# Patient Record
Sex: FEMALE | Race: WHITE | NOT HISPANIC OR LATINO | Employment: OTHER | URBAN - METROPOLITAN AREA
[De-identification: names, ages, dates, MRNs, and addresses within clinical notes are randomized per-mention and may not be internally consistent; named-entity substitution may affect disease eponyms.]

---

## 2018-05-04 ENCOUNTER — OFFICE VISIT CONVERTED (OUTPATIENT)
Dept: PULMONOLOGY | Facility: CLINIC | Age: 63
End: 2018-05-04
Attending: INTERNAL MEDICINE

## 2018-06-21 ENCOUNTER — OFFICE VISIT CONVERTED (OUTPATIENT)
Dept: PULMONOLOGY | Facility: CLINIC | Age: 63
End: 2018-06-21
Attending: PHYSICIAN ASSISTANT

## 2019-02-06 ENCOUNTER — OFFICE VISIT CONVERTED (OUTPATIENT)
Dept: PULMONOLOGY | Facility: CLINIC | Age: 64
End: 2019-02-06
Attending: INTERNAL MEDICINE

## 2019-02-22 ENCOUNTER — HOSPITAL ENCOUNTER (OUTPATIENT)
Dept: OTHER | Facility: HOSPITAL | Age: 64
Discharge: HOME | End: 2019-02-22
Attending: INTERNAL MEDICINE

## 2019-02-22 LAB
BASE EXCESS BLD CALC-SCNC: 0.1 MMOL/L
COHGB MFR BLD: 0.9 % (ref 0–1.5)
CONV ALLEN'S TEST: NORMAL
CONV FHHB: 3.4 % (ref 0–5)
CONV FIO2: NORMAL % (ref 21–100)
CONV SITE: NORMAL
HBA1C MFR BLD: 14.5 % (ref 11.7–14.6)
HCO3 BLDA-SCNC: 24 MMOL/L (ref 22–26)
LABORATORY COMMENT REPORT: NORMAL
LITERS PER MINUTE: NORMAL L/MIN
Lab: NORMAL
METHGB MFR BLD: 0.3 % (ref 0–1.5)
OXYHGB MFR BLD: 95.4 % (ref 94–98)
PCO2 BLD: 36.9 MM[HG] (ref 35–45)
PH UR: 7.43 [PH] (ref 7.35–7.45)
PO2 BLD: 85.7 MM[HG] (ref 80–100)
SAO2 % BLDCOA: 96.6 % (ref 95–99)
SPECIMEN SOURCE: NORMAL

## 2019-05-02 ENCOUNTER — OFFICE VISIT CONVERTED (OUTPATIENT)
Dept: PULMONOLOGY | Facility: CLINIC | Age: 64
End: 2019-05-02
Attending: PHYSICIAN ASSISTANT

## 2020-12-29 ENCOUNTER — HOSPITAL ENCOUNTER (OUTPATIENT)
Dept: GENERAL RADIOLOGY | Facility: HOSPITAL | Age: 65
Discharge: HOME | End: 2020-12-29
Attending: INTERNAL MEDICINE

## 2021-04-07 ENCOUNTER — HOSPITAL ENCOUNTER (OUTPATIENT)
Dept: GENERAL RADIOLOGY | Facility: HOSPITAL | Age: 66
Discharge: HOME | End: 2021-04-07
Attending: NURSE PRACTITIONER

## 2021-05-28 VITALS
HEIGHT: 61 IN | BODY MASS INDEX: 46.29 KG/M2 | HEART RATE: 85 BPM | DIASTOLIC BLOOD PRESSURE: 83 MMHG | WEIGHT: 245.19 LBS | BODY MASS INDEX: 45.34 KG/M2 | OXYGEN SATURATION: 95 % | HEART RATE: 90 BPM | TEMPERATURE: 98.1 F | RESPIRATION RATE: 18 BRPM | SYSTOLIC BLOOD PRESSURE: 118 MMHG | DIASTOLIC BLOOD PRESSURE: 79 MMHG | HEIGHT: 61 IN | OXYGEN SATURATION: 98 % | WEIGHT: 240.12 LBS | RESPIRATION RATE: 14 BRPM | SYSTOLIC BLOOD PRESSURE: 142 MMHG

## 2021-05-28 VITALS
HEIGHT: 61 IN | RESPIRATION RATE: 16 BRPM | DIASTOLIC BLOOD PRESSURE: 62 MMHG | WEIGHT: 245 LBS | BODY MASS INDEX: 45.35 KG/M2 | SYSTOLIC BLOOD PRESSURE: 114 MMHG | BODY MASS INDEX: 46.26 KG/M2 | OXYGEN SATURATION: 93 % | SYSTOLIC BLOOD PRESSURE: 120 MMHG | OXYGEN SATURATION: 98 % | TEMPERATURE: 98.4 F | DIASTOLIC BLOOD PRESSURE: 72 MMHG | HEART RATE: 67 BPM | HEART RATE: 62 BPM | RESPIRATION RATE: 16 BRPM | HEIGHT: 61 IN | TEMPERATURE: 98.2 F | WEIGHT: 240.19 LBS

## 2021-05-28 NOTE — PROGRESS NOTES
Patient: VIRGIL SAWANT     Acct: [id number]     Report: #[id number]  UNIT #: [id number]     : 1955    Encounter Date:2018  PRIMARY CARE: Doreen Brewster  ***Signed***  --------------------------------------------------------------------------------------------------------------------  Chief Complaint      Encounter Date      May 4, 2018            Primary Care Provider      Doreen Brewster            Referring Provider      Doreen Brewster            Patient Complaint      Patient is complaining of      New pt here for Asthma            VITALS      Height 5 ft 1 in / 154.94 cm      Weight 240 lbs 2 oz / 108.944978 kg      BSA 2.23 m2      BMI 45.4 kg/m2      Temperature 98.1 F / 36.72 C - Oral      Pulse 85      Respirations 18      Blood Pressure 118/83 Sitting, Left Arm      Pulse Oximetry 98%, Room air            HPI      The patient is a 63 year old white female with a history of hypertension and     obstructive sleep apnea here today for new patient visit.  The patient has been     having shortness of breath now for greater than one year.  She has seen Dr. Jacobo and diagnosed with asthma.  The shortness of breath she feels has     gotten worse over the course of the past year. She works a      and does have shortness of breath on a daily basis, in particular what makes     her really short of breath is when she walks up a flight of stairs.  She will     develop some wheezing and some chest tightness, but no chest pains.  Episodes     of shortness of breath last for several minutes, better with rest and with the     use of her rescue inhaler, also better with the use of her Symbicort. The     patient does take Symbicort, but takes it approximately six times per week and     most of the time does not take it in the afternoon, just takes it in the     morning.  She denies having any associated chest pain or heart palpitations.      She does have some mild lower extremity edema.  Denies having any orthopnea.      She describes her symptoms as moderate to severe in nature and overall feels     that they are getting worse.  She has no associated cough or mucopurulent     production.  The patient is a lifelong never smoker.  Has no significant     environmental or occupational exposures at this time.            The patient does have obstructive sleep apnea, does not wear her mask on a     daily basis and uses it about every other night because she has difficulties     with comfort.            ROS      Constitutional:  Denies: Fatigue, Fever, Weight gain, Weight loss, Chills,     Insomnia, Other      Respiratory/Breathing:  Complains of: Shortness of air, Wheezing, Cough, Denies    : Hemoptysis, Pleuritic pain, Other      Endocrine:  Denies: Polydipsia, Polyuria, Heat/cold intolerance, Abnorml     menstrual pattern, Diabetes, Other      Eyes:  Denies: Blurred vision, Vision Changes, Other      Ears, nose, mouth, throat:  Denies: Mouth lesions, Thrush, Throat pain,     Hoarseness, Allergies/Hay Fever, Post Nasal Drip, Headaches, Recent Head Injury    , Nose Bleeding, Neck Stiffness, Thyroid Mass, Hearing Loss, Ear Fullness, Dry     Mouth, Nasal or Sinus Pain, Dry Lips, Nasal discharge, Nasal congestion, Other      Cardiovascular:  Denies: Palpitations, Syncope, Claudication, Chest Pain, Wake     up Gasping for air, Leg Swelling, Irregular Heart Rate, Cyanosis, Dyspnea on     Exertion, Other      Gastrointestinal:  Denies: Nausea, Constipation, Diarrhea, Abdominal pain,     Vomiting, Difficulty Swallowing, Reflux/Heartburn, Dysphagia, Jaundice, Bloating    , Melena, Bloody stools, Other      Genitourinary:  Denies: Urinary frequency, Incontinence, Hematuria, Urgency,     Nocturia, Dysuria, Testicular problems, Other      Musculoskeletal:  Denies: Joint Pain, Joint Stiffness, Joint Swelling, Myalgias    , Other      Hematologic/lymphatic:  DENIES: Lymphadenopathy, Bruising, Bleeding tendencies,    "  Other      Neurological:  Denies: Headache, Numbness, Weakness, Seizures, Other      Psychiatric:  Denies: Anxiety, Appropriate Effect, Depression, Other      Sleep:  No: Excessive daytime sleep, Morning Headache?, Snoring, Insomnia?,     Stop breathing at sleep?, Other      Integumentary:  Denies: Rash, Dry skin, Skin Warm to Touch, Other      Immunologic/Allergic:  Denies: Latex allergy, Seasonal allergies, Asthma,     Urticaria, Eczema, Other      Immunization status:  No: Up to date            FAMILY/SOCIAL/MEDICAL HX      Surgical History:  Yes: Abdominal Surgery (lap band), Bladder Surgery (bladder     tacking), Cholecystectomy, Oral Surgery (TOOTH EXTRACTIONS \"LONG TIME AGO\"),     Orthopedic Surgery (right shoulder sx  2014), No: Appendectomy, Bowel Surgery,     CABG, Head Surgery, Vascular Surgery      Stroke - Family Hx:  Grandparent      Diabetes - Family Hx:  Mother      Cancer/Type - Family Hx:  Sister      Other Family Medical History:  Mother      Is Father Still Living?:  No      Is Mother Still Living?:  Yes      Smoking status:  Never smoker      Anticoagulation Therapy:  No      Antibiotic Prophylaxis:  No      Medical History:  Yes: Allergies, Arthritis (generalized), Chronic Bronchitis/    COPD, Hemorrhoids/Rectal Prob (LAP BAND), No: Asthma, Blood Disease,     Chemotherapy/Cancer, Congestive Heart Failu, Deafness or Ringing Ears, Seizures    , Heart Attack, Shortness Of Breath, Miscellaneous Medical/oth      Psychiatric History      none            PREVENTION      Hx Influenza Vaccination:  Yes      Date Influenza Vaccine Given:  Oct 1, 2017      Influenza Vaccine Declined:  No      2 or More Falls Past Year?:  No      Fall Past Year with Injury?:  No      Hx Pneumococcal Vaccination:  No      Encouraged to follow-up with:  PCP regarding preventative exams.      Chart initiated by      Radha Medley MA            ALLERGIES/MEDICATIONS      Allergies:        Coded Allergies:             " MEPERIDINE (Verified  Adverse Reaction, Unknown, NAUSEA, 5/4/18)           PROPOXYPHENE (Verified  Adverse Reaction, Unknown, NAUSEA, 5/4/18)      Medications    Last Reconciled on 5/4/18 08:45 by ADRIAN MORENO MD      (nutrilite liver)   No Conflict Check               Reported         5/4/18       Gluc Rodríguez/Chondro Rodríguez A/Vit C/Mn (Osteo Biflex) 1 Each Tablet      1 TAB PO QDAY, #30 TAB         Reported         5/4/18       traZODone HCl (Desyrel) 50 Mg Tablet      50 MG PO HS, #30 TAB 0 Refills         Reported         5/4/18       Loratadine (Loratadine) 10 Mg Tablet      10 MG PO HS, #30 TAB 0 Refills         Reported         5/4/18       Budesonide/Formoterol Fumarate (Symbicort 160/4.5 Mcg) 10.2 Gm Inh      2 PUFF INH RTBID, #1 INH 0 Refills         Reported         5/4/18       Ibuprofen (Ibuprofen) 600 Mg Tablet      PO HS, #120 TAB 0 Refills         Reported         5/4/18       Ibuprofen (Ibuprofen) 800 Mg Tablet      800 MG PO QDAY, #90 TAB 0 Refills         Reported         5/4/18       Magnesium Amino Acid Chelate (Magnesium*) 100 Mg Tablet      500 MG PO QDAY, TAB         Reported         5/4/18       Aspirin (Aspirin Baby *) 81 Mg Tab.chew      81 MG PO QDAY, #30 TAB.CHEW 0 Refills         Reported         5/4/18       Cholecalciferol (Vitamin D3*) 1,000 Unit Tab      2000 UNITS PO QDAY, #60 TAB 0 Refills         Reported         5/4/18       Metoprolol Succinate (Metoprolol Succinate*) 50 Mg Tab.er.24h      12.5 MG PO BID, #30 TAB.SR.24H 0 Refills         Reported         5/4/18       Thyroid ([place] Thyroid*) 30 Mg Tab      30 MG PO BID, #30 TAB         Reported         5/4/18       Milk Thistle Fruit Extract (Milk Thistle) 175 Mg Capsule      1000 MG PO BID, CAP         Reported         9/11/14      Current Medications      Current Medications Reviewed 5/4/18            EXAM      GEN-patient appears stated age resting comfortable in no acute distress      Eyes-PERRL,  conjunctiva are normal in  appearance extraocular muscles are intact    , no scleral icterus      Nasal-both nares are patent turbinates appear normal no polyps seen no nasal     discharge or ulcerations      Ears-tympanic membranes are normal no erythema no bulging, normal to inspection      Lymphatic-no swollen or enlarged cervical nodes, or axillary node, or femoral     nodes, or supraclavicular nodes      Mouth normal dentition, no erythema no ulcerations oropharynx appears normal no     exudate no evidence of postnasal drip, MP(3)      Neck-there are no palpable supraclavicular or cervical adenopathy, thyroid is     normal in appearance no apparent nodules, there is no inspiratory or expiratory     stridor      Respiratory-patient exhibits normal work of breathing, speaking in full     sentences without difficulty, the chest is normal in appearance, clear to     auscultation with no wheezes rales or rhonchi, chest is normal to percussion on     both the right and left sides      Cardiovascular-the heart rate is normal and regular S1 and S2 present with no     murmur or extra heart sounds, there is no JVD or pedal edema present      GI-the abdomen is normal in appearance, bowel sounds present and normal in all     quadrants no hepatosplenomegaly or masses felt      Extremities-no clubbing is present, pulses present in all extremities,     capillary refill time is normal      Musculoskeletal-Normal strength in upper and lower extremities, inspection     shows no evidence of muscle atrophy      Skin-skin is normal in appearance it is warm and dry, no rashes present, no     evidence of cyanosis, palpation reveals no masses      Neurological-the patient is alert and oriented to time place and person, moves     all 4 extremities, normal gait, normal affect and mood, CN2-12 intact      Psych-normal judgment and insight is good, normal mood and affect, alert and     oriented to person, place, and time, and date      Vtials      Vitals:              Height 5 ft 1 in / 154.94 cm           Weight 240 lbs 2 oz / 108.107331 kg           BSA 2.23 m2           BMI 45.4 kg/m2           Temperature 98.1 F / 36.72 C - Oral           Pulse 85           Respirations 18           Blood Pressure 118/83 Sitting, Left Arm           Pulse Oximetry 98%, Room air            REVIEW      Results Reviewed      [facility] Results Reviewed?:  Yes Prev Lab Results, Yes Prev Radiology Results, Yes     Previous [facility] Records            Assessment      SOB (shortness of breath) - R06.02            Notes      New Medications      * THYROID ([place] Thyroid*) 30 MG TAB: 30 MG PO BID #30      * Metoprolol Succinate (Metoprolol Succinate*) 50 MG TAB.ER.24H: 12.5 MG PO BID     #30      * Cholecalciferol (Vitamin D3*) 1,000 UNIT TAB: 2,000 UNITS PO QDAY #60      * Aspirin (Aspirin Baby *) 81 MG TAB.CHEW: 81 MG PO QDAY #30      * Magnesium Amino Acid Chelate (Magnesium*) 100 MG TABLET: 500 MG PO QDAY      * Ibuprofen 800 MG TABLET: 800 MG PO QDAY #90      * Ibuprofen 600 MG TABLET: PO HS #120      * Budesonide/Formoterol Fumarate (Symbicort 160/4.5 Mcg) 10.2 GM INH: 2 PUFF     INH RTBID #1      * Loratadine 10 MG TABLET: 10 MG PO HS #30      * traZODone HCl (Desyrel) 50 MG TABLET: 50 MG PO HS #30      * Gluc Rodríguez/Chondro Rodríguez A/Vit C/Mn (Osteo Biflex) 1 EACH TABLET: 1 TAB PO QDAY #30      * (nutrilite liver):       * Fluticasone/Umeclidin/Vilanter (Trelegy Ellipta 100-62.5-25) 1 EACH BLST.W.DEV    : 1 PUFF INH RTQDAY #1      Changed Medications      * Milk Thistle Fruit Extract (Milk Thistle) 175 MG CAPSULE: 1,000 MG PO BID       Instructions: 1 CAP      Discontinued Medications      * Aspirin (Aspirin*) 325 MG TAB: 325 MG PO QDAY #14      * oxyCODONE/Acetaminophen 7.5/325 Mg 1 TAB TABLET: 1 TAB PO Q4H PRN PAIN #50      New Diagnostics      * Echo Complete, Week       Dx: SOB (shortness of breath) - R06.02      ASSESSMENT/PLAN:       1.  Moderate persistent asthma.  At this time the patient has  difficulties     taking her medication as prescribed. We will add an anticholinergic agent and     we will change patient's medication to Trelegy to help with compliance.      2.  Allergic rhinitis. Continue Claritin.      3.  Obstructive sleep apnea. Continue pap therapy and we will change her     interface.      4.  See patient back in 8 weeks to assess response to medication.      5. Dyspnea.  We will obtain echocardiogram to rule out cardiac causes.      6.  I have personally reviewed laboratory data, imaging as well as previous     medical records.            Patient Education      Education resources provided:  Yes      Patient Education Provided:  Sleep Apnea                 Disclaimer: Converted document may not contain table formatting or lab diagrams. Please see [facility] for the authenticated document.

## 2021-05-28 NOTE — PROGRESS NOTES
Patient: VIRGIL SAWANT     Acct: [id number]     Report: #[id number]  UNIT #: [id number]     : 1955    Encounter Date:2019  PRIMARY CARE: Doreen Brewster  ***Signed***  --------------------------------------------------------------------------------------------------------------------  Chief Complaint      Encounter Date      May 2, 2019            Primary Care Provider      Doreen Brewster            Referring Provider      Doreen Brewster            Patient Complaint      Patient is complaining of      3 month follow up/soa            VITALS      Height 5 ft 1.00 in / 154.94 cm      Weight 245 lbs  / 111.609010 kg      BSA 2.06 m2      BMI 46.3 kg/m2      Temperature 98.4 F / 36.89 C - Oral      Pulse 67      Respirations 16      Blood Pressure 114/62 Sitting, Left Arm      Pulse Oximetry 93%, room air            HPI      The patient is a very pleasant 64 year old white female patient of Dr. Gonzalez's    here for follow up today for her fatigue. She has a history of morbid obesity,     obstructive sleep apnea on nightly CPAP. Her CPAP compliance has been very good     and her apnea hypopnea index is very low. It was 0.8 on most recent CPAP     compliance data. Despite this, she does not feel well rested, does not feel like    she is getting good sleep and always feels fatigued. At her last visit in     2019 Dr. Gonzalez requested labs from her primary care provider to make     sure she was no anemic or having iron deficiency and unfortunately we did not     receive them. He also ordered an arterial blood gas to rule out hypercapneia and    her arterial blood gas was grossly normal. She continues to be very fatigued so     she is asking me about possible restless leg syndrome. She denies any increased     dyspnea, coughing or wheezing, denies any hemoptysis, fever or chills. She     continues to use Symbicort for her asthma and feels it helps her.             I reviewed her Review of Systems,  medical, surgical and family history and agree    with those as entered.      Copies To:   Carter Gonzalez      Constitutional:  Complains of: Fatigue; Denies: Fever, Weight gain, Weight loss,    Chills, Insomnia, Other      Respiratory/Breathing:  Complains of: Shortness of air, Wheezing; Denies: Cough,    Hemoptysis, Pleuritic pain, Other      Endocrine:  Denies: Polydipsia, Polyuria, Heat/cold intolerance, Abnorml     menstrual pattern, Diabetes, Other      Eyes:  Denies: Blurred vision, Vision Changes, Other      Ears, nose, mouth, throat:  Denies: Mouth lesions, Thrush, Throat pain,     Hoarseness, Allergies/Hay Fever, Post Nasal Drip, Headaches, Recent Head Injury,    Nose Bleeding, Neck Stiffness, Thyroid Mass, Hearing Loss, Ear Fullness, Dry     Mouth, Nasal or Sinus Pain, Dry Lips, Nasal discharge, Nasal congestion, Other      Cardiovascular:  Denies: Palpitations, Syncope, Claudication, Chest Pain, Wake     up Gasping for air, Leg Swelling, Irregular Heart Rate, Cyanosis, Dyspnea on     Exertion, Other      Gastrointestinal:  Denies: Nausea, Constipation, Diarrhea, Abdominal pain,     Vomiting, Difficulty Swallowing, Reflux/Heartburn, Dysphagia, Jaundice,     Bloating, Melena, Bloody stools, Other      Genitourinary:  Denies: Urinary frequency, Incontinence, Hematuria, Urgency,     Nocturia, Dysuria, Testicular problems, Other      Musculoskeletal:  Denies: Joint Pain, Joint Stiffness, Joint Swelling, Myalgias,    Other      Hematologic/lymphatic:  DENIES: Lymphadenopathy, Bruising, Bleeding tendencies,     Other      Neurological:  Denies: Headache, Numbness, Weakness, Seizures, Other      Psychiatric:  Denies: Anxiety, Appropriate Effect, Depression, Other      Sleep:  No: Excessive daytime sleep, Morning Headache?, Snoring, Insomnia?, Stop    breathing at sleep?, Other      Integumentary:  Denies: Rash, Dry skin, Skin Warm to Touch, Other      Immunologic/Allergic:  Denies: Latex  "allergy, Seasonal allergies, Asthma,     Urticaria, Eczema, Other      Immunization status:  No: Up to date            FAMILY/SOCIAL/MEDICAL HX      Surgical History:  Yes: Abdominal Surgery (lap band), Bladder Surgery (bladder     tacking), Cholecystectomy, Oral Surgery (TOOTH EXTRACTIONS \"LONG TIME AGO\"),     Orthopedic Surgery (right shoulder sx  2014); No: Appendectomy, Bowel Surgery,     CABG, Head Surgery, Vascular Surgery      Stroke - Family Hx:  Grandparent      Diabetes - Family Hx:  Mother      Cancer/Type - Family Hx:  Sister      Other Family Medical History:  Mother      Is Father Still Living?:  No      Is Mother Still Living?:  Yes      Social History:  No Tobacco Use, No Alcohol Use, No Recreational Drug use      Smoking status:  Never smoker      Anticoagulation Therapy:  No      Antibiotic Prophylaxis:  No      Medical History:  Yes: Allergies, Arthritis (generalized), Chronic     Bronchitis/COPD, Hemorrhoids/Rectal Prob (LAP BAND); No: Alcoholism, Anemia,     Asthma, Atrial Fibrillation, Blood Disease, Broken Bones, Cataracts, Chemical     Dependency, Chemotherapy/Cancer, Emphysema, Chronic Liver Disease, Colon     Trouble, Colitis, Diverticulitis, Congestive Heart Failu, Deafness or Ringing     Ears, Convulsions, Depression, Anxiety, Bipolar Disorder, PTSD, Diabetes,     Epilepsy, Seizures, Forgetfullness, Glaucoma, Gall Stones, Gout, Head Injury,     Heart Attack, Heart Murmur, GERD, Hepatitis, Hiatal Hernia, High Blood Pressure,    High Cholesterol, HIV (Do not ask - volu, Jaundice, Kidney or Bladder Disease,     Kidney Stones, Migrane Headaches, Mitral Valve Prolapse, Night sweats,     Phlebitis, Psychiatric Care, Reflux Disease, Rheumatic Fever, Sexually     Transmitted Dis, Shortness Of Breath, Sinus Trouble, Skin Disease/Psoriais/Ecz,     Stroke, Thyroid Problem, Tuberculosis or Pos TB Te, Miscellaneous Medical/oth      Psychiatric History      none            PREVENTION      Hx Influenza " Vaccination:  Yes      Date Influenza Vaccine Given:  Oct 1, 2018      Influenza Vaccine Declined:  No      2 or More Falls Past Year?:  No      Fall Past Year with Injury?:  No      Hx Pneumococcal Vaccination:  Yes      Encouraged to follow-up with:  PCP regarding preventative exams.      Chart initiated by      sunitha cat ma            ALLERGIES/MEDICATIONS      Allergies:        Coded Allergies:             MEPERIDINE (Verified  Adverse Reaction, Unknown, NAUSEA, 5/2/19)           PROPOXYPHENE (Verified  Adverse Reaction, Unknown, NAUSEA, 5/2/19)      Medications    Last Reconciled on 5/2/19 16:47 by LORRAINE BANGURA      rOPINIRole HCl (Requip) 0.25 Mg Tab      0.25 MG PO HS for 30 Days, #30 TAB 3 Refills         Prov: Mulu Cruz PA-C         5/2/19       Albuterol (Proair HFA) 8.5 Gm Inh      2 PUFFS INH RTQ4H, #1 INH 4 Refills         Prov: Carter Gonzalez         2/6/19       Budesonide/Formoterol Fumarate (Symbicort 160/4.5 Mcg) 10.2 Gm Inh      2 PUFF INH BID, #3 INH 4 Refills         Prov: Carter Gonzalez         2/6/19       (osteoprime plus)   No Conflict Check               Reported         2/6/19       Coenzyme Q10 (Co Q-10) 100 Mg Capsule      100 MG PO QDAY for 30 Days, #30 CAP         Reported         2/6/19       (Msm)   No Conflict Check               Reported         2/6/19       (biote probiotic)   No Conflict Check               Reported         2/6/19       Thyroid,Pork (NP Thyroid*) 30 Mg Tab      32.5 MG PO QDAY, TAB         Reported         2/6/19       traZODone HCl (traZODone HCl*) 100 Mg Tablet      100 MG PO HS, #30 TAB 0 Refills         Reported         2/6/19       Potassium Gluconate (Potassium Gluconate) 99 Mg Tablet      550 MG PO QDAY for 30 Days, #165 TAB         Reported         6/21/18       (nutrilite liver)   No Conflict Check               Reported         5/4/18       Loratadine (Loratadine) 10 Mg Tablet      10 MG PO HS, #30 TAB 0 Refills         Reported          5/4/18       Ibuprofen (Ibuprofen) 600 Mg Tablet      PO HS, #120 TAB 0 Refills         Reported         5/4/18       Ibuprofen (Ibuprofen) 800 Mg Tablet      800 MG PO QDAY, #90 TAB 0 Refills         Reported         5/4/18       Magnesium Amino Acid Chelate (Magnesium*) 100 Mg Tablet      500 MG PO QDAY, TAB         Reported         5/4/18       Aspirin Chew (Aspirin Baby) 81 Mg Tab.chew      81 MG PO QDAY, #30 TAB.CHEW 0 Refills         Reported         5/4/18       Cholecalciferol (Vitamin D3*) 1,000 Unit Tab      2000 UNITS PO QDAY, #60 TAB 0 Refills         Reported         5/4/18       Metoprolol Succinate (Metoprolol Succinate*) 50 Mg Tab.er.24h      12.5 MG PO BID, #30 TAB.SR.24H 0 Refills         Reported         5/4/18      Current Medications      Current Medications Reviewed 5/2/19            EXAM      GEN-patient appears stated age resting comfortable in no acute distress      Eyes-PERRL,  conjunctiva are normal in appearance extraocular muscles are     intact, no scleral icterus      Lymphatic-no swollen or enlarged cervical nodes, or axillary node, or femoral     nodes, or supraclavicular nodes      Mouth very poor dentition, no erythema no ulcerations oropharynx appears normal     no exudate no evidence of postnasal drip, MP       Neck-there are no palpable supraclavicular or cervical adenopathy, thyroid is     normal in appearance no apparent nodules, there is no inspiratory or expiratory     stridor      Respiratory-grossly clear to auscultation, no wheezes, rhonchi or crackles     appreciated, normal work of breathing noted noted.        Cardiovascular-the heart rate is normal and regular S1 and S2 present with no     murmur or extra heart sounds, there is no JVD or pedal edema present      GI-the abdomen is normal in appearance, bowel sounds present and normal in all     quadrants no hepatosplenomegaly or masses felt      Extremities-no clubbing is present, pulses present in all  extremities, capillary    refill time is normal      Musculoskeletal-Normal strength in upper and lower extremities, inspection shows    no evidence of muscle atrophy      Skin-skin is normal in appearance it is warm and dry, no rashes present, no     evidence of cyanosis, palpation reveals no masses      Neurological-the patient is alert and oriented to time place and person, moves     all 4 extremities, normal gait, normal affect and mood, CN2-12 intact      Psych-normal judgment and insight is good, normal mood and affect, alert and     oriented to person, place, and time, and date      Vtials      Vitals:             Height 5 ft 1.00 in / 154.94 cm           Weight 245 lbs  / 111.470390 kg           BSA 2.06 m2           BMI 46.3 kg/m2           Temperature 98.4 F / 36.89 C - Oral           Pulse 67           Respirations 16           Blood Pressure 114/62 Sitting, Left Arm           Pulse Oximetry 93%, room air            REVIEW      Results Reviewed      [facility] Results Reviewed?:  Yes Prev Lab Results, Yes Prev Radiology Results, Yes     Previous [facility] Records            Assessment      Notes      New Medications      * rOPINIRole HCl (Requip) 0.25 MG TAB: 0.25 MG PO HS 30 Days #30      Changed Medications      * Metoprolol Succinate (Metoprolol Succinate*) 50 MG TAB.ER.24H: 12.5 MG PO BID       #30         Instructions: 1/2 a tab am 1/2 a tab pm      ASSESSMENT:       1.  Excessive daytime sleepiness/obstructive sleep apnea appears to be well     controlled on nightly CPAP       2.  Morbid obesity with body mass index of 46.3.      3. Asthma without acute exacerbation.             PLAN:      1. I have discussed with the patient that her recent arterial blood gas was     within normal limits. I have also discussed the patient with Dr. Gonzalez about     possibly repeating a  sleep study since it does not appear that her previous      sleep study done through Dr. Jacobo's office made any comment to if she was      having any abnormal leg movements or any indication of restless leg syndrome. I     have offered doing a repeat  sleep study to the patient or treating her with a     low dose of Requip for possible restless leg syndrome. If restless leg syndrome     is the cause of these symptoms that should improve with the Requip. She would     like to try this so I have prescribed 2.5 mg q HS.       2. Continue Symbicort and albuterol as needed for her asthma.       3. I will request recent blood work from her primary care provider's office. The    patient believes her blood work was done in January 2019. We will review this to    make sure she is not anemic or having iron deficiency.       4. Follow up with Dr. Gonzalez in 3 months, sooner if needed.            Patient Education      Patient Education Provided:  Sleep Apnea      Time Spent:  > 50% /[facility]                 Disclaimer: Converted document may not contain table formatting or lab diagrams. Please see [employer] for the authenticated document.

## 2021-05-28 NOTE — PROGRESS NOTES
Patient: VIRGIL SAWANT     Acct: [id number]     Report: #[id number]  UNIT #: [id number]     : 1955    Encounter Date:2019  PRIMARY CARE: Doreen Brewster  ***Signed***  --------------------------------------------------------------------------------------------------------------------  Chief Complaint      Encounter Date      2019            Primary Care Provider      Doreen Brewster            Referring Provider      Doreen Brewster            Patient Complaint      Patient is complaining of      Pt here for 8m f/u, billy            VITALS      Height 5 ft 1 in / 154.94 cm      Weight 245 lbs 3 oz / 111.368862 kg      BSA 2.06 m2      BMI 46.3 kg/m2      Pulse 90      Respirations 14      Blood Pressure 142/79 Sitting, Right Arm      Pulse Oximetry 95%, Room air            HPI      The patient is a 64 year old female here today for follow up.  The patient has     sleep apnea, has apnea/hypopnea index of 0.8, still feels fatigue, uses her     fitibt tracker which says she is not getting adequate sleep at night.  The     patient sleeps for greater than six hours and most of the time eight hours on a     nightly basis.  She has fatigue, has to use 5 hour energy during the day, has     had weight gain of more than 25 pounds since her initial office to me. She has     no shortness of breath at this time. She reports compliance with the use of her     Symbicort, uses her rescue inhaler very infrequently.  She denies having any     chest pains or lower extremity edema.  She has not been started on any new     medications and has not been started on any new hypnotic medications.  She uses     trazodone to sleep at night and feels it helps with her sleep as far as going to    sleep, but she still feels excessively tired during the day.            ROS      Constitutional:  Denies: Fatigue, Fever, Weight gain, Weight loss, Chills,     Insomnia, Other      Respiratory/Breathing:  Complains of: Shortness of air;  "Denies: Wheezing, Cough,    Hemoptysis, Pleuritic pain, Other      Endocrine:  Denies: Polydipsia, Polyuria, Heat/cold intolerance, Abnorml     menstrual pattern, Diabetes, Other      Eyes:  Denies: Blurred vision, Vision Changes, Other      Ears, nose, mouth, throat:  Denies: Mouth lesions, Thrush, Throat pain, Dania    rseness, Allergies/Hay Fever, Post Nasal Drip, Headaches, Recent Head Injury,     Nose Bleeding, Neck Stiffness, Thyroid Mass, Hearing Loss, Ear Fullness, Dry     Mouth, Nasal or Sinus Pain, Dry Lips, Nasal discharge, Nasal congestion, Other      Cardiovascular:  Denies: Palpitations, Syncope, Claudication, Chest Pain, Wake     up Gasping for air, Leg Swelling, Irregular Heart Rate, Cyanosis, Dyspnea on     Exertion, Other      Gastrointestinal:  Denies: Nausea, Constipation, Diarrhea, Abdominal pain,     Vomiting, Difficulty Swallowing, Reflux/Heartburn, Dysphagia, Jaundice,     Bloating, Melena, Bloody stools, Other      Genitourinary:  Denies: Urinary frequency, Incontinence, Hematuria, Urgency,     Nocturia, Dysuria, Testicular problems, Other      Musculoskeletal:  Denies: Joint Pain, Joint Stiffness, Joint Swelling, Myalgias,    Other      Hematologic/lymphatic:  DENIES: Lymphadenopathy, Bruising, Bleeding tendencies,     Other      Neurological:  Denies: Headache, Numbness, Weakness, Seizures, Other      Psychiatric:  Denies: Anxiety, Appropriate Effect, Depression, Other      Sleep:  No: Excessive daytime sleep, Morning Headache?, Snoring, Insomnia?, Stop    breathing at sleep?, Other      Integumentary:  Denies: Rash, Dry skin, Skin Warm to Touch, Other      Immunologic/Allergic:  Denies: Latex allergy, Seasonal allergies, Asthma,     Urticaria, Eczema, Other      Immunization status:  No: Up to date            FAMILY/SOCIAL/MEDICAL HX      Surgical History:  Yes: Abdominal Surgery (lap band), Bladder Surgery (bladder     tacking), Cholecystectomy, Oral Surgery (TOOTH EXTRACTIONS \"LONG TIME " "AGO\"),     Orthopedic Surgery (right shoulder sx  2014); No: Appendectomy, Bowel Surgery,     CABG, Head Surgery, Vascular Surgery      Stroke - Family Hx:  Grandparent      Diabetes - Family Hx:  Mother      Cancer/Type - Family Hx:  Sister      Other Family Medical History:  Mother      Is Father Still Living?:  No      Is Mother Still Living?:  Yes      Smoking status:  Never smoker      Anticoagulation Therapy:  No      Antibiotic Prophylaxis:  No      Medical History:  Yes: Allergies, Arthritis (generalized), Chronic     Bronchitis/COPD, Hemorrhoids/Rectal Prob (LAP BAND); No: Asthma, Blood Disease,     Chemotherapy/Cancer, Congestive Heart Failu, Deafness or Ringing Ears, Seizures,    Heart Attack, Shortness Of Breath, Sinus Trouble, Miscellaneous Medical/oth      Psychiatric History      None            PREVENTION      Hx Influenza Vaccination:  Yes      Date Influenza Vaccine Given:  Oct 1, 2018      Influenza Vaccine Declined:  No      2 or More Falls Past Year?:  No      Fall Past Year with Injury?:  No      Hx Pneumococcal Vaccination:  No      Encouraged to follow-up with:  PCP regarding preventative exams.      Chart initiated by      Radha Medley MA            ALLERGIES/MEDICATIONS      Allergies:        Coded Allergies:             MEPERIDINE (Verified  Adverse Reaction, Unknown, NAUSEA, 2/6/19)           PROPOXYPHENE (Verified  Adverse Reaction, Unknown, NAUSEA, 2/6/19)      Medications    Last Reconciled on 2/6/19 16:57 by CARTER MORENO MD      Albuterol (Proair HFA) 8.5 Gm Inh      2 PUFFS INH RTQ4H, #1 INH 4 Refills         Prov: Carter Moreno         2/6/19       Budesonide/Formoterol Fumarate (Symbicort 160/4.5 Mcg) 10.2 Gm Inh      2 PUFF INH BID, #3 INH 4 Refills         Prov: Carter Moreno         2/6/19       Budesonide/Formoterol Fumarate (Symbicort 80/4.5 Mcg) 10.2 Gm Hfa.aer.ad      2 PUFF INH RTBID, #1 INH         Reported         2/6/19       (osteoprime plus)   No Conflict Check "               Reported         2/6/19       Coenzyme Q10 (Co Q-10) 100 Mg Capsule      100 MG PO QDAY for 30 Days, #30 CAP         Reported         2/6/19       (Msm)   No Conflict Check               Reported         2/6/19       (biote probiotic)   No Conflict Check               Reported         2/6/19       Thyroid,Pork (NP Thyroid*) 30 Mg Tab      32.5 MG PO QDAY, TAB         Reported         2/6/19       traZODone HCl (traZODone HCl*) 100 Mg Tablet      100 MG PO HS, #30 TAB 0 Refills         Reported         2/6/19       Potassium Gluconate (Potassium Gluconate) 99 Mg Tablet      550 MG PO QDAY for 30 Days, #165 TAB         Reported         6/21/18       (nutrilite liver)   No Conflict Check               Reported         5/4/18       Loratadine (Loratadine) 10 Mg Tablet      10 MG PO HS, #30 TAB 0 Refills         Reported         5/4/18       Ibuprofen (Ibuprofen) 600 Mg Tablet      PO HS, #120 TAB 0 Refills         Reported         5/4/18       Ibuprofen (Ibuprofen) 800 Mg Tablet      800 MG PO QDAY, #90 TAB 0 Refills         Reported         5/4/18       Magnesium Amino Acid Chelate (Magnesium*) 100 Mg Tablet      500 MG PO QDAY, TAB         Reported         5/4/18       Aspirin Chew (Aspirin Baby) 81 Mg Tab.chew      81 MG PO QDAY, #30 TAB.CHEW 0 Refills         Reported         5/4/18       Cholecalciferol (Vitamin D3*) 1,000 Unit Tab      2000 UNITS PO QDAY, #60 TAB 0 Refills         Reported         5/4/18       Metoprolol Succinate (Metoprolol Succinate*) 50 Mg Tab.er.24h      12.5 MG PO BID, #30 TAB.SR.24H 0 Refills         Reported         5/4/18      Current Medications      Current Medications Reviewed 2/6/19            EXAM      GEN-patient appears stated age resting comfortable in no acute distress      Eyes-PERRL,  conjunctiva are normal in appearance extraocular muscles are     intact, no scleral icterus      Nasal-both nares are patent turbinates appear normal no polyps seen no nasal      discharge or ulcerations      Ears-tympanic membranes are normal no erythema no bulging, normal to inspection      Lymphatic-no swollen or enlarged cervical nodes, or axillary node, or femoral     nodes, or supraclavicular nodes      Mouth normal dentition, no erythema no ulcerations oropharynx appears normal no     exudate no evidence of postnasal drip, MP(3)      Neck-there are no palpable supraclavicular or cervical adenopathy, thyroid is     normal in appearance no apparent nodules, there is no inspiratory or expiratory     stridor      Respiratory-patient exhibits normal work of breathing, speaking in full     sentences without difficulty, the chest is normal in appearance, clear to     auscultation with no wheezes rales or rhonchi, chest is normal to percussion on     both the right and left sides      Cardiovascular-the heart rate is normal and regular S1 and S2 present with no     murmur or extra heart sounds, there is no JVD or pedal edema present      GI-the abdomen is normal in appearance, bowel sounds present and normal in all     quadrants no hepatosplenomegaly or masses felt      Extremities-no clubbing is present, pulses present in all extremities, capillary     refill time is normal      Musculoskeletal-Normal strength in upper and lower extremities, inspection shows     no evidence of muscle atrophy      Skin-skin is normal in appearance it is warm and dry, no rashes present, no     evidence of cyanosis, palpation reveals no masses      Neurological-the patient is alert and oriented to time place and person, moves     all 4 extremities, normal gait, normal affect and mood, CN2-12 intact      Psych-normal judgment and insight is good, normal mood and affect, alert and     oriented to person, place, and time, and date      Vtials      Vitals:             Height 5 ft 1 in / 154.94 cm           Weight 245 lbs 3 oz / 111.764959 kg           BSA 2.06 m2           BMI 46.3 kg/m2           Pulse 90            Respirations 14           Blood Pressure 142/79 Sitting, Right Arm           Pulse Oximetry 95%, Room air            REVIEW      Results Reviewed      [facility] Results Reviewed?:  Yes Prev Lab Results, Yes Prev Radiology Results, Yes     Previous [facility] Records            Assessment      Fatigue - R53.83            Notes      New Medications      * traZODone HCl (traZODone HCl*) 100 MG TABLET: 100 MG PO HS #30         Replaced traZODone HCl (Desyrel) 50 MG TABLET:         50 MG PO HS #30      * Thyroid,Pork (NP Thyroid*) 30 MG TAB: 32.5 MG PO QDAY      * (biote probiotic):       * (MSM):       * COENZYME Q10 (Co Q-10) 100 MG CAPSULE: 100 MG PO QDAY 30 Days #30      * (osteoprime plus):       * Budesonide/Formoterol Fumarate (Symbicort 80/4.5 Mcg) 10.2 GM HFA.AER.AD: 2       PUFF INH RTBID #1      * Budesonide/Formoterol Fumarate (Symbicort 160/4.5 Mcg) 10.2 GM INH: 2 PUFF INH      BID #3      * ALBUTEROL (Proair HFA) 8.5 GM INH: 2 PUFFS INH RTQ4H #1      Discontinued Medications      * Fluticasone/Umeclidin/Vilanter (Trelegy Ellipta 100-62.5-25) 1 EACH       BLST.W.DEV: 1 PUFF INH RTQDAY #1      New Diagnostics      * ABGS, Week         Dx: Fatigue - R53.83      ASSESSMENT:       1.  Excessive daytime sleepiness.      2.  Sleep apnea.        3.  Morbid obesity with body mass index of 46.            PLAN:      1.  At this time, I discussed with the patient there are other causes of fatigue    other than sleep apnea.  It appears her sleep apnea is well-controlled, however     I cannot say if she does or does not have problems with restless leg syndrome.      At this time, I will request medical records from her PCP to check to see if she    has had ferritin level checked along with CBC and thyroid function given her     hypothyroidism and weight gain.  I explained to her that these could cause     problems with excessive sleepiness.        2.  I have also expressed to the patient that I want an arterial blood gas to      rule out hypercarbia as another potential cause.      3.  For the patient's asthma, we will continue her on her current medications,     Symbicort, Spiriva and albuterol.      4. I have personally reviewed laboratory data, imaging as well as previous [facility] records.            Patient Education      Education resources provided:  Yes      Patient Education Provided:  Sleep Apnea                 Disclaimer: Converted document may not contain table formatting or lab diagrams. Please see [employer] for the authenticated document.

## 2021-05-28 NOTE — PROGRESS NOTES
Patient: VIRGIL SAWANT     Acct: [id number]     Report: #[id number]  UNIT #: [id number]     : 1955    Encounter Date:2018  PRIMARY CARE: Doreen Brewster  ***Signed***  --------------------------------------------------------------------------------------------------------------------  Chief Complaint      Encounter Date      2018            Primary Care Provider      Doreen Brewster            Referring Provider      Doreen Brewster            Patient Complaint      Patient is complaining of      Pt is here for follow up, test results/SOB            VITALS      Height 5 ft 1 in / 154.94 cm      Weight 240 lbs 3 oz / 108.442742 kg      BSA 2.23 m2      BMI 45.4 kg/m2      Temperature 98.2 F / 36.78 C - Oral      Pulse 62      Respirations 16      Blood Pressure 120/72 Sitting, Left Arm      Pulse Oximetry 98%, room air            HPI      The patient is a very pleasant 63 year old white female who is a patient of Dr. Andrade. She was seen as a new patient about a month ago at which time she     was having issues with moderate persistent asthma. She had been on Symbicort     but had difficulty remembering to use it twice daily so it was not helping her     very much. She was also having issues with her CPAP as she had a full face mask     that would not seal well and have air blowing into her eyes. This made it very     difficult for her to wear her mask. At her last office visit her Symbicort was     discontinued and changed to trelegy  which she only needs to use once a day.      The patient states that she has had no issues remembering to use it. She has     been using it once a day every day and feels it is helping her breathing more.     She denies issues with coughing or wheezing or increased dyspnea on exertion.     She has some seasonal allergies and more nasal congestion when she ran out of     her Claritin but she is getting more of that and does not expect to have     further  issues.  Her CPAP mask was changed to nasal pillows and she reports     this is much easier for her to use. She is not having any issued with the nasal     pillows and has used it almost every single night since being switched. It was     switched about 2 weeks ago so her most recent CPAP compliance data still has     some dates where she was not using it. She is a lifelong nonsmoker.             I have reviewed his Review of Systems medical, surgical and family history and     agree with those as entered.            ROS      Constitutional:  Complains of: Fatigue, Denies: Fever, Weight gain, Weight loss    , Chills, Insomnia, Other      Respiratory/Breathing:  Denies: Shortness of air, Wheezing, Cough, Hemoptysis,     Pleuritic pain, Other      Endocrine:  Denies: Polydipsia, Polyuria, Heat/cold intolerance, Abnorml     menstrual pattern, Diabetes, Other      Eyes:  Denies: Blurred vision, Vision Changes, Other      Ears, nose, mouth, throat:  Denies: Mouth lesions, Thrush, Throat pain,     Hoarseness, Allergies/Hay Fever, Post Nasal Drip, Headaches, Recent Head Injury    , Nose Bleeding, Neck Stiffness, Thyroid Mass, Hearing Loss, Ear Fullness, Dry     Mouth, Nasal or Sinus Pain, Dry Lips, Nasal discharge, Nasal congestion, Other      Cardiovascular:  Denies: Palpitations, Syncope, Claudication, Chest Pain, Wake     up Gasping for air, Leg Swelling, Irregular Heart Rate, Cyanosis, Dyspnea on     Exertion, Other      Gastrointestinal:  Denies: Nausea, Constipation, Diarrhea, Abdominal pain,     Vomiting, Difficulty Swallowing, Reflux/Heartburn, Dysphagia, Jaundice, Bloating    , Melena, Bloody stools, Other      Genitourinary:  Denies: Urinary frequency, Incontinence, Hematuria, Urgency,     Nocturia, Dysuria, Testicular problems, Other      Musculoskeletal:  Denies: Joint Pain, Joint Stiffness, Joint Swelling, Myalgias    , Other      Hematologic/lymphatic:  DENIES: Lymphadenopathy, Bruising, Bleeding tendencies,   "   Other      Neurological:  Denies: Headache, Numbness, Weakness, Seizures, Other      Psychiatric:  Denies: Anxiety, Appropriate Effect, Depression, Other      Sleep:  No: Excessive daytime sleep, Morning Headache?, Snoring, Insomnia?,     Stop breathing at sleep?, Other      Integumentary:  Denies: Rash, Dry skin, Skin Warm to Touch, Other      Immunologic/Allergic:  Denies: Latex allergy, Seasonal allergies, Asthma,     Urticaria, Eczema, Other      Immunization status:  No: Up to date            FAMILY/SOCIAL/MEDICAL HX      Surgical History:  Yes: Abdominal Surgery (lap band), Bladder Surgery (bladder     tacking), Cholecystectomy, Oral Surgery (TOOTH EXTRACTIONS \"LONG TIME AGO\"),     Orthopedic Surgery (right shoulder sx  2014), No: Appendectomy, Bowel Surgery,     CABG, Head Surgery, Vascular Surgery      Stroke - Family Hx:  Grandparent      Diabetes - Family Hx:  Mother      Cancer/Type - Family Hx:  Sister      Other Family Medical History:  Mother      Is Father Still Living?:  No      Is Mother Still Living?:  Yes      Smoking status:  Never smoker      Anticoagulation Therapy:  No      Antibiotic Prophylaxis:  No      Medical History:  Yes: Allergies, Arthritis (generalized), Chronic Bronchitis/    COPD, Hemorrhoids/Rectal Prob (LAP BAND), No: Asthma, Blood Disease,     Chemotherapy/Cancer, Congestive Heart Failu, Deafness or Ringing Ears, Seizures    , Heart Attack, Shortness Of Breath, Sinus Trouble, Miscellaneous Medical/oth      Psychiatric History      None            PREVENTION      Hx Influenza Vaccination:  Yes      Date Influenza Vaccine Given:  Oct 1, 2017      Influenza Vaccine Declined:  No      2 or More Falls Past Year?:  No      Fall Past Year with Injury?:  No      Hx Pneumococcal Vaccination:  No      Encouraged to follow-up with:  PCP regarding preventative exams.            ALLERGIES/MEDICATIONS      Allergies:        Coded Allergies:             MEPERIDINE (Verified  Adverse " Reaction, Unknown, NAUSEA, 6/21/18)           PROPOXYPHENE (Verified  Adverse Reaction, Unknown, NAUSEA, 6/21/18)      Medications    Last Reconciled on 6/21/18 09:19 by BRIAN WATSON PA-C      Potassium Gluconate (Potassium Gluconate) 99 Mg Tablet      550 MG PO QDAY for 30 Days, #165 TAB         Reported         6/21/18       Fluticasone/Umeclidin/Vilanter (Trelegy Ellipta 100-62.5-25) 1 Each Blst.w.dev      1 PUFF INH RTQDAY, #1 INH 11 Refills         Prov: Carter Gonzalez         5/4/18       (nutrilite liver)   No Conflict Check               Reported         5/4/18       Gluc Rodríguez/Chondro Rodríguez A/Vit C/Mn (Osteo Biflex) 1 Each Tablet      1 TAB PO QDAY, #30 TAB         Reported         5/4/18       traZODone HCl (Desyrel) 50 Mg Tablet      50 MG PO HS, #30 TAB 0 Refills         Reported         5/4/18       Loratadine (Loratadine) 10 Mg Tablet      10 MG PO HS, #30 TAB 0 Refills         Reported         5/4/18       Ibuprofen (Ibuprofen) 600 Mg Tablet      PO HS, #120 TAB 0 Refills         Reported         5/4/18       Ibuprofen (Ibuprofen) 800 Mg Tablet      800 MG PO QDAY, #90 TAB 0 Refills         Reported         5/4/18       Magnesium Amino Acid Chelate (Magnesium*) 100 Mg Tablet      500 MG PO QDAY, TAB         Reported         5/4/18       Aspirin (Aspirin Baby *) 81 Mg Tab.chew      81 MG PO QDAY, #30 TAB.CHEW 0 Refills         Reported         5/4/18       Cholecalciferol (Vitamin D3*) 1,000 Unit Tab      2000 UNITS PO QDAY, #60 TAB 0 Refills         Reported         5/4/18       Metoprolol Succinate (Metoprolol Succinate*) 50 Mg Tab.er.24h      12.5 MG PO BID, #30 TAB.SR.24H 0 Refills         Reported         5/4/18       Thyroid ([place] Thyroid*) 30 Mg Tab      30 MG PO BID, #30 TAB         Reported         5/4/18      Current Medications      Current Medications Reviewed 6/21/18            EXAM      GEN-patient appears stated age resting comfortable in no acute distress      Eyes-PERRL,  conjunctiva  are normal in appearance extraocular muscles are intact    , no scleral icterus      Nasal-both nares are patent turbinates appear normal no polyps seen no nasal     discharge or ulcerations      Ears-tympanic membranes are normal no erythema no bulging, normal to inspection      Lymphatic-no swollen or enlarged cervical nodes, or axillary node, or femoral     nodes, or supraclavicular nodes      Mouth normal dentition, no erythema no ulcerations oropharynx appears normal no     exudate no evidence of postnasal drip, MP(default value)      Neck-there are no palpable supraclavicular or cervical adenopathy, thyroid is     normal in appearance no apparent nodules, there is no inspiratory or expiratory     stridor      Respiratory-patient exhibits normal work of breathing, speaking in full     sentences without difficulty, the chest is normal in appearance, clear to     auscultation with no wheezes rales or rhonchi, chest is normal to percussion on     both the right and left sides      Cardiovascular-the heart rate is normal and regular S1 and S2 present with no     murmur or extra heart sounds, there is no JVD or pedal edema present      GI-the abdomen is normal in appearance, bowel sounds present and normal in all     quadrants no hepatosplenomegaly or masses felt      Extremities-no clubbing is present, pulses present in all extremities,     capillary refill time is normal      Musculoskeletal-Normal strength in upper and lower extremities, inspection     shows no evidence of muscle atrophy      Skin-skin is normal in appearance it is warm and dry, no rashes present, no     evidence of cyanosis, palpation reveals no masses      Neurological-the patient is alert and oriented to time place and person, moves     all 4 extremities, normal gait, normal affect and mood, CN2-12 intact      Psych-normal judgment and insight is good, normal mood and affect, alert and     oriented to person, place, and time, and date       Vtials      Vitals:             Height 5 ft 1 in / 154.94 cm           Weight 240 lbs 3 oz / 108.178762 kg           BSA 2.23 m2           BMI 45.4 kg/m2           Temperature 98.2 F / 36.78 C - Oral           Pulse 62           Respirations 16           Blood Pressure 120/72 Sitting, Left Arm           Pulse Oximetry 98%, room air            REVIEW      Results Reviewed      PCCS Results Reviewed?:  Yes Prev Lab Results, Yes Prev Radiology Results, Yes     Previous [facility] Records            Assessment      Notes      New Medications      * Potassium Gluconate 99 MG TABLET: 550 MG PO QDAY 30 Days #165      ASSESSMENT:       1.  Moderate persistent asthma.        2.  Allergic rhinitis.      3.  Obstructive sleep apnea on nightly CPAP.        4. Dyspnea resolving.             PLAN:      1. The patient is doing much better on daily trelegy and her asthma is much     better controlled. Continue trelegy at this time.       2. Continue on Claritin for her allergic rhinitis. She gets this through mail     order pharmacy and should be receiving this today.      3. I have reviewed her most recent CPAP compliance information. Over the lat 20     days her average usage has been 5 hours and 2 minutes and her average AHI is     1.1. I suspect this is lower overall usage because part of this 20 day time     period includes the time prior to 06/03/18 when she was using her old mask that     did not fit her well. Her compliance has been much better with the nasal pillow     mask. Her next compliance data should be significantly better. I have counseled     her on sleep hygiene and encouraged her to use the nasal pillows every night     and with naps.       4. I reviewed her echocardiogram with her today which was grossly within normal     limits. It showed normal systolic function with an ejection fraction of 55% and     no significant valvular abnormalities.        5.  Continue trelegy 1 puff daily.       6. Follow up with us in  6-8 months, sooner if needed.            Patient Education      Patient Education Provided:  Acute Asthma, How to use an Inhaler      Time Spent:  > 50% /[facility]                 Disclaimer: Converted document may not contain table formatting or lab diagrams. Please see [employer] for the authenticated document.

## 2021-10-19 ENCOUNTER — TELEPHONE (OUTPATIENT)
Dept: ORTHOPEDIC SURGERY | Facility: CLINIC | Age: 66
End: 2021-10-19

## 2021-10-19 NOTE — TELEPHONE ENCOUNTER
Right knee/xray [facility]/med/[facility]/seen [facility] 10/13/21   Had surg in 2016 with Aissatou in a lot of pain

## 2021-10-21 ENCOUNTER — OFFICE VISIT (OUTPATIENT)
Dept: ORTHOPEDIC SURGERY | Facility: CLINIC | Age: 66
End: 2021-10-21

## 2021-10-21 VITALS — HEIGHT: 61 IN | WEIGHT: 250 LBS | HEART RATE: 65 BPM | BODY MASS INDEX: 47.2 KG/M2 | OXYGEN SATURATION: 96 %

## 2021-10-21 DIAGNOSIS — S80.01XA CONTUSION OF RIGHT KNEE, INITIAL ENCOUNTER: Primary | ICD-10-CM

## 2021-10-21 DIAGNOSIS — M25.561 RIGHT KNEE PAIN, UNSPECIFIED CHRONICITY: ICD-10-CM

## 2021-10-21 PROCEDURE — 99203 OFFICE O/P NEW LOW 30 MIN: CPT | Performed by: ORTHOPAEDIC SURGERY

## 2021-10-21 NOTE — PROGRESS NOTES
"Chief Complaint  Initial Evaluation and Pain of the Right Knee     Subjective      Cris Cid presents to [facility] for an evaluation of right knee. Patient had a previous ORIF of the patella. She fell one week ago and landed directly on her knee. She wanted to have her knee evaluated to assure she didn’t have any damage. She has pain below and around her incision from the ORIF.     No Known Allergies     Social History     Socioeconomic History   • Marital status:    Tobacco Use   • Smoking status: Never Smoker   • Smokeless tobacco: Never Used   Substance and Sexual Activity   • Alcohol use: Not Currently   • Drug use: Never        Review of Systems     Objective   Vital Signs:   Pulse 65   Ht 154.9 cm (61\")   Wt 113 kg (250 lb)   SpO2 96%   BMI 47.24 kg/m²       Physical Exam  Constitutional:       Appearance: Normal appearance. Patient is well-developed and normal weight.   HENT:      Head: Normocephalic.      Right Ear: Hearing and external ear normal.      Left Ear: Hearing and external ear normal.      Nose: Nose normal.   Eyes:      Conjunctiva/sclera: Conjunctivae normal.   Cardiovascular:      Rate and Rhythm: Normal rate.   Pulmonary:      Effort: Pulmonary effort is normal.      Breath sounds: No wheezing or rales.   Abdominal:      Palpations: Abdomen is soft.      Tenderness: There is no abdominal tenderness.   Musculoskeletal:      Cervical back: Normal range of motion.   Skin:     Findings: No rash.   Neurological:      Mental Status: Patient is alert and oriented to person, place, and time.   Psychiatric:         Mood and Affect: Mood and affect normal.         Judgment: Judgment normal.       Ortho Exam      RIGHT KNEE: Decreased range of motion. Tender to palpation. Stable to varus/valgus stress. Negative Lachman. Good strength to hamstrings, quadriceps, dorsiflexors and plantar flexors. Sensation grossly intact. Neurovascular intact.  Dorsal Pedal " Pulse 2+, posterior tibialis pulse 2+. Calf supple, non-tender, no signs of DVT. Moderate swelling.       Procedures      Imaging Results (Most Recent)     None           Result Review :       XR Knee 1 or 2 View Right    Addendum Date: 10/13/2021 Addendum:   ADDENDUM:  COMPARISON: [facility], , KNEE LIMITED ONE/TWO VIEWS RT, 1/12/2016, 15:40.  The procedure title should read two views of the right knee.  The header of right shoulder is incorrect.    YAEL RAMACHANDRAN MD       Electronically Signed and Approved By: YAEL RAMACHANDRAN MD on 10/13/2021 at 20:57             Result Date: 10/13/2021  Narrative: PROCEDURE: XR KNEE 1 OR 2 VW RIGHT  COMPARISON: [facility], , KNEE LIMITED ONE/TWO VIEWS RT, 1/12/2016, 15:40.  INDICATIONS: Fell on RIGHT knee 3 hrs ago, h/o patellar fx remotely s/p repair per pt  FINDINGS:  There are postoperative changes from right shoulder plate and screw fixation.  There is no evidence of an acute fracture.  There is no evidence of shoulder dislocation.  CONCLUSION: There postoperative changes from a previous right proximal humeral fracture fixation with plate and screws.  There is no definite dislocation of the right shoulder.      YAEL RAMACHANDRAN MD       Electronically Signed and Approved By: YAEL RAMACHANDRAN MD on 10/13/2021 at 20:11                      Assessment and Plan     DX: Right knee contusion    Patient prescribed Voltaren Gel to rub on her knee.     Call or return if worsening symptoms.    Follow Up     3 weeks.       Patient was given instructions and counseling regarding her condition or for health maintenance advice. Please see specific information pulled into the AVS if appropriate.     Scribed for Rachel Petersen MD by Kimi Bergeron.  10/21/21   10:36 EDT        I have personally performed the services described in this document as scribed by the above individual and it is both accurate and complete. Rachel Petersen MD 10/22/21

## 2021-10-27 ENCOUNTER — TELEPHONE (OUTPATIENT)
Dept: ORTHOPEDIC SURGERY | Facility: CLINIC | Age: 66
End: 2021-10-27

## 2021-10-29 ENCOUNTER — OFFICE VISIT (OUTPATIENT)
Dept: ORTHOPEDIC SURGERY | Facility: CLINIC | Age: 66
End: 2021-10-29

## 2021-10-29 VITALS — BODY MASS INDEX: 49.08 KG/M2 | WEIGHT: 250 LBS | TEMPERATURE: 97.8 F | HEIGHT: 60 IN

## 2021-10-29 DIAGNOSIS — S82.141A CLOSED FRACTURE OF RIGHT TIBIAL PLATEAU, INITIAL ENCOUNTER: ICD-10-CM

## 2021-10-29 DIAGNOSIS — M25.561 RIGHT KNEE PAIN, UNSPECIFIED CHRONICITY: Primary | ICD-10-CM

## 2021-10-29 PROCEDURE — 99213 OFFICE O/P EST LOW 20 MIN: CPT | Performed by: ORTHOPAEDIC SURGERY

## 2021-10-29 PROCEDURE — 73562 X-RAY EXAM OF KNEE 3: CPT | Performed by: ORTHOPAEDIC SURGERY

## 2021-10-29 RX ORDER — MAGNESIUM CHLORIDE 64 MG
TABLET, DELAYED RELEASE (ENTERIC COATED) ORAL DAILY
COMMUNITY

## 2021-10-29 RX ORDER — LORATADINE 10 MG/1
TABLET ORAL
COMMUNITY
Start: 2021-10-13

## 2021-10-29 NOTE — PROGRESS NOTES
New Right Knee      Patient: Cris Cid        YOB: 1955    Medical Record Number: [id number]        Chief Complaints: Right knee pain      History of Present Illness: This is a 66-year-old female who fell approximately 2 weeks ago onto the anterior aspect of her knees.  She is status post patella ORIF many years ago and that was doing fine until her fall.  She has a lot going on right now that her  had a wreck while driving for work and is an incomplete quadriplegic.  They are currently staying in a hotel try to get their house ready for a wheelchair and he is doing physical therapy.  She has a lot on her plate is quite emotional today she states she just does not have time to have knee pain.  Her symptoms are moderate to severe worse with activity and ambulation somewhat better with rest past medical history is remarkable for obesity asthma hypertension and pneumonia        Allergies:   Allergies   Allergen Reactions   • Tape Other (See Comments)     Paper tape causes blisters       Medications:   Home Medications:  Current Outpatient Medications on File Prior to Visit   Medication Sig   • ASPIRIN 81 PO Daily.   • Budesonide-Formoterol Fumarate (SYMBICORT IN) Every 12 (Twelve) Hours.   • Cholecalciferol (VITAMIN D3 EXTRA STRENGTH PO) Daily.   • Coenzyme Q10 (COQ10 PO) Daily.   • Diclofenac Sodium (VOLTAREN) 1 % gel gel Apply 4 g topically to the appropriate area as directed 4 (Four) Times a Day.   • ibuprofen (ADVIL,MOTRIN) 400 MG tablet 2   • loratadine (CLARITIN) 10 MG tablet    • magnesium chloride ER 64 MG DR tablet Take  by mouth Daily.   • Methylsulfonylmethane 1000 MG capsule Take  by mouth.   • metoprolol tartrate (LOPRESSOR) 25 MG tablet    • MILK THISTLE PO 2 tablets   • multivitamin with minerals (OSTEOPRIME PLUS PO) Daily.   • NP THYROID PO Daily.   • POTASSIUM GLUCONATE PO Daily.   • Probiotic Product (PROBIOTIC DAILY PO) ONE   • rOPINIRole (REQUIP) 0.25 MG tablet    •  "Symbicort 160-4.5 MCG/ACT inhaler    • traZODone (DESYREL) 50 MG tablet      No current facility-administered medications on file prior to visit.     Current Medications:  Scheduled Meds:  Continuous Infusions:No current facility-administered medications for this visit.    PRN Meds:.    Past Medical History:   Diagnosis Date   • Asthma    • Hypertension    • Pneumonia         Past Surgical History:   Procedure Laterality Date   •  SECTION     • CHOLECYSTECTOMY     • HYSTERECTOMY     • KNEE SURGERY Right    • SHOULDER SURGERY Right    • TONSILLECTOMY          Social History     Occupational History   • Not on file   Tobacco Use   • Smoking status: Never Smoker   • Smokeless tobacco: Never Used   Vaping Use   • Vaping Use: Never used   Substance and Sexual Activity   • Alcohol use: Not Currently   • Drug use: Never   • Sexual activity: Defer      Social History     Social History Narrative   • Not on file      History reviewed. No pertinent family history.          Review of Systems: 14 point review of systems Víctor for the knee pain only remainder negative per the patient    Review of Systems      Physical Exam: 66 y.o. female  General Appearance:    Alert, cooperative, in no acute distress                   Vitals:    10/29/21 1339   Temp: 97.8 °F (36.6 °C)   Weight: 113 kg (250 lb)   Height: 152.4 cm (60\")   PainSc:   8      Patient is alert and read ×3 no acute distress appears her above-listed at height weight and age.  Affect is normal respiratory rate is normal unlabored. Heart rate regular rate rhythm, sclera, dentition and hearing are normal for the purpose of this exam.        Ortho Exam exam of the right knee she has healed surgical incision she does have mild effusion she has tenderness over her tibial plateau she does have range of motion 0-90 calf is soft and nontender    Procedures             Radiology:   AP, Lateral and merchant views of the right knee  were ordered/reviewed to tang " pain.  I did compared to x-rays that she came with she has had evidence of prior ORIF of the patella I do think I see a tibial plateau fracture that extends up to the lateral tibial plateau not definitive though  Imaging Results (Most Recent)     Procedure Component Value Units Date/Time    XR Knee 3 View Right [[id number]] Resulted: 10/29/21 1410     Updated: 10/29/21 1410    Impression:      Ordering physician's impression is located in the Encounter Note dated 10/29/21. X-ray performed in the DR room.          Assessment/Plan:    Right knee pain I would be concerned about a tibial plateau fracture plan I put an immobilizer because I thought she would be well comfortable and she was monitored limiting her weightbearing as much as she can we will get a MRI on Monday when she is back in town

## 2021-11-02 ENCOUNTER — TELEPHONE (OUTPATIENT)
Dept: ORTHOPEDIC SURGERY | Facility: CLINIC | Age: 66
End: 2021-11-02

## 2021-11-02 NOTE — TELEPHONE ENCOUNTER
----- Message from Kamilah Magallanes MD sent at 11/2/2021 10:53 AM EDT -----  I left patient a message and told her what the results were encouraged her to a little weightbearing.  I should see her back in roughly 3weeks but can see her sooner or later what ever works best with her and her  schedule.  If you could call her to make an appointment you will probably have to leave a message

## 2021-11-15 ENCOUNTER — TELEPHONE (OUTPATIENT)
Dept: ORTHOPEDIC SURGERY | Facility: CLINIC | Age: 66
End: 2021-11-15

## 2021-11-15 NOTE — TELEPHONE ENCOUNTER
Caller: VIRGIL SAWANT     Relationship: SELF    Best call back number: [phone]    What is the best time to reach you: ANY TIME     Who are you requesting to speak with (clinical staff, provider,  specific staff member): CM    Do you know the name of the person who called: CM    What was the call regarding: SCHEDULING APPT PER TE , CM CALLED THIS MORNING TO SPEAK WITH PT TO SCHEDULE    Do you require a callback: YES

## 2022-04-12 ENCOUNTER — TRANSCRIBE ORDERS (OUTPATIENT)
Dept: ADMINISTRATIVE | Facility: HOSPITAL | Age: 67
End: 2022-04-12

## 2022-04-12 DIAGNOSIS — Z12.31 SCREENING MAMMOGRAM, ENCOUNTER FOR: Primary | ICD-10-CM

## 2022-05-31 ENCOUNTER — OFFICE VISIT (OUTPATIENT)
Dept: PODIATRY | Facility: CLINIC | Age: 67
End: 2022-05-31

## 2022-05-31 VITALS
DIASTOLIC BLOOD PRESSURE: 55 MMHG | OXYGEN SATURATION: 98 % | WEIGHT: 246 LBS | HEART RATE: 60 BPM | SYSTOLIC BLOOD PRESSURE: 106 MMHG | BODY MASS INDEX: 46.44 KG/M2 | HEIGHT: 61 IN | TEMPERATURE: 97.1 F

## 2022-05-31 DIAGNOSIS — M79.671 FOOT PAIN, RIGHT: Primary | ICD-10-CM

## 2022-05-31 DIAGNOSIS — M77.51 BURSITIS OF RIGHT FOOT: ICD-10-CM

## 2022-05-31 PROCEDURE — 99203 OFFICE O/P NEW LOW 30 MIN: CPT | Performed by: PODIATRIST

## 2022-05-31 PROCEDURE — 20605 DRAIN/INJ JOINT/BURSA W/O US: CPT | Performed by: PODIATRIST

## 2022-05-31 RX ORDER — TRIAMCINOLONE ACETONIDE 40 MG/ML
20 INJECTION, SUSPENSION INTRA-ARTICULAR; INTRAMUSCULAR ONCE
Status: COMPLETED | OUTPATIENT
Start: 2022-05-31 | End: 2022-05-31

## 2022-05-31 RX ORDER — LEVOTHYROXINE, LIOTHYRONINE 57; 13.5 UG/1; UG/1
90 TABLET ORAL DAILY
COMMUNITY
Start: 2022-05-23

## 2022-05-31 RX ORDER — DEXAMETHASONE SODIUM PHOSPHATE 4 MG/ML
2 INJECTION, SOLUTION INTRA-ARTICULAR; INTRALESIONAL; INTRAMUSCULAR; INTRAVENOUS; SOFT TISSUE ONCE
Status: COMPLETED | OUTPATIENT
Start: 2022-05-31 | End: 2022-05-31

## 2022-05-31 RX ORDER — LIDOCAINE HYDROCHLORIDE 10 MG/ML
0.5 INJECTION, SOLUTION INFILTRATION; PERINEURAL ONCE
Status: COMPLETED | OUTPATIENT
Start: 2022-05-31 | End: 2022-05-31

## 2022-05-31 RX ADMIN — TRIAMCINOLONE ACETONIDE 20 MG: 40 INJECTION, SUSPENSION INTRA-ARTICULAR; INTRAMUSCULAR at 11:50

## 2022-05-31 RX ADMIN — DEXAMETHASONE SODIUM PHOSPHATE 2 MG: 4 INJECTION, SOLUTION INTRA-ARTICULAR; INTRALESIONAL; INTRAMUSCULAR; INTRAVENOUS; SOFT TISSUE at 11:47

## 2022-05-31 RX ADMIN — LIDOCAINE HYDROCHLORIDE 0.5 ML: 10 INJECTION, SOLUTION INFILTRATION; PERINEURAL at 11:48

## 2022-05-31 NOTE — PROGRESS NOTES
[facility] - PODIATRY    Today's Date: 22    Patient Name: Cris Cid  MRN: [id number]  CSN: [id number]  PCP: Doreen Brewster APRN  Referring Provider: Jamilah Early A*    SUBJECTIVE     Chief Complaint   Patient presents with   • Right Foot - Pain     HPI: Cris Cid, a 67 y.o.female, presents to clinic.    New, Established, New Problem: New    Location: Right lateral midfoot    Duration: Early 2022    Onset: Insidious but believes it occurred while transferring her  who is paralyzed.    Nature: Sore, burning    Stable, worsening, improving: Stable, no improvement    Aggravating factors:  Patient relates pain is aggravated by shoe gear and ambulation.      Previous Treatment: X-ray    Patient denies any fevers, chills, nausea, vomiting, shortness of breath, nor any other constitutional signs nor symptoms.    No other pedal complaints at this time.    Recent medical changes: None    Past Medical History:   Diagnosis Date   • Asthma    • Hypertension    • Pneumonia      Past Surgical History:   Procedure Laterality Date   •  SECTION     • CHOLECYSTECTOMY     • HYSTERECTOMY     • KNEE SURGERY Right    • SHOULDER SURGERY Right    • TONSILLECTOMY       Family History   Problem Relation Age of Onset   • Diabetes Father    • Diabetes Sister    • Asthma Sister    • Diabetes Brother      Social History     Socioeconomic History   • Marital status:    Tobacco Use   • Smoking status: Never Smoker   • Smokeless tobacco: Never Used   Vaping Use   • Vaping Use: Never used   Substance and Sexual Activity   • Alcohol use: Not Currently   • Drug use: Never   • Sexual activity: Defer     Allergies   Allergen Reactions   • Tape Other (See Comments)     Paper tape causes blisters     Current Outpatient Medications   Medication Sig Dispense Refill   • ASPIRIN 81 PO Daily.     • Cholecalciferol (VITAMIN D3 EXTRA STRENGTH PO) Daily.     • Coenzyme Q10 (COQ10 PO)  Daily.     • DANDELION PO Take  by mouth.     • ibuprofen (ADVIL,MOTRIN) 400 MG tablet 2     • loratadine (CLARITIN) 10 MG tablet      • magnesium chloride ER 64 MG DR tablet Take  by mouth Daily.     • Methylsulfonylmethane 1000 MG capsule Take  by mouth.     • metoprolol tartrate (LOPRESSOR) 25 MG tablet      • MILK THISTLE PO 2 tablets     • multivitamin with minerals tablet tablet Daily.     • NP Thyroid 90 MG tablet Take 90 mg by mouth Daily.     • POTASSIUM GLUCONATE PO Daily.     • Probiotic Product (PROBIOTIC DAILY PO) ONE     • rOPINIRole (REQUIP) 0.25 MG tablet      • Symbicort 160-4.5 MCG/ACT inhaler      • traZODone (DESYREL) 50 MG tablet        Current Facility-Administered Medications   Medication Dose Route Frequency Provider Last Rate Last Admin   • dexamethasone sodium phosphate injection 2 mg  2 mg Intramuscular Once Andrea Stevenson DPM       • lidocaine (XYLOCAINE) 1 % injection 0.5 mL  0.5 mL Infiltration Once Andrea Stevenson DPM       • triamcinolone acetonide (KENALOG-40) injection 20 mg  20 mg Intramuscular Once Andrea Stevenson DPM         Review of Systems   Constitutional: Negative.    Musculoskeletal:        Pain in right lateral midfoot   All other systems reviewed and are negative.      OBJECTIVE     Vitals:    05/31/22 0822   BP: 106/55   Pulse: 60   Temp: 97.1 °F (36.2 °C)   SpO2: 98%       Hgb   Date Value Ref Range Status   02/22/2019 14.5 11.7 - 14.6 Final         No results found for: GLUCOSE, BUN, CREATININE, EGFRIFNONA, EGFRIFAFRI, BCR, K, CO2, CALCIUM, PROTENTOTREF, ALBUMIN, LABIL2, BILIRUBIN, AST, ALT    Patient seen in no apparent distress.      PHYSICAL EXAM:     Foot/Ankle Exam:       General:   Appearance: obesity and elderly    Orientation: AAOx3    Affect: appropriate    Gait: unimpaired    Shoe Gear:  Casual shoes    VASCULAR      Right Foot Vascularity   Normal vascular exam    Dorsalis pedis:  2+  Posterior tibial:  2+  Skin Temperature: warm     Edema Grading:  None  CFT:  < 3 seconds  Pedal Hair Growth:  Present  Varicosities: mild varicosities       Left Foot Vascularity   Normal vascular exam    Dorsalis pedis:  2+  Posterior tibial:  2+  Skin Temperature: warm    Edema Grading:  None  CFT:  < 3 seconds  Pedal Hair Growth:  Absent  Varicosities: mild varicosities        NEUROLOGIC     Right Foot Neurologic   Normal sensation    Light touch sensation:  Normal  Vibratory sensation:  Normal  Hot/Cold sensation: normal    Protective Sensation using [place]-Flex Monofilament:  10     Left Foot Neurologic   Normal sensation    Light touch sensation:  Normal  Vibratory sensation:  Normal  Hot/cold sensation: normal    Protective Sensation using [place]-Flex Monofilament:  10     MUSCULOSKELETAL      Right Foot Musculoskeletal   Ecchymosis:  None  Tenderness: fifth metatarsal base       MUSCLE STRENGTH     Right Foot Muscle Strength   Foot dorsiflexion:  4  Foot plantar flexion:  4  Foot inversion:  4  Foot eversion:  4     Left Foot Muscle Strength   Foot dorsiflexion:  4  Foot plantar flexion:  4  Foot inversion:  4  Foot eversion:  4     RANGE OF MOTION      Right Foot Range of Motion   Foot and ankle ROM within normal limits       Left Foot Range of Motion   Foot and ankle ROM within normal limits       DERMATOLOGIC     Right Foot Dermatologic   Skin: skin intact    Nails: normal       Left Foot Dermatologic   Skin: skin intact    Nails: normal        RADIOLOGY:      PROCEDURE:  XR FOOT 3+ VW RIGHT     COMPARISON: None     INDICATIONS:  pain, swelling, bruising x 1 week     FINDINGS:          There is no acute fracture or dislocation.  The patient has an old healed 3rd metatarsal fracture.    There is joint space narrowing and spurring involving the 1st MTP joint and the interphalangeal   joints of the digits consistent with mild osteoarthritis.  There are prominent calcaneal spurs.    The soft tissues are unremarkable.     IMPRESSION:                No acute findings.      RYAN MORLEY MD         Electronically Signed and Approved By: RYAN MORLEY MD on 4/14/2022 at 11:43       ASSESSMENT/PLAN     Diagnoses and all orders for this visit:    1. Foot pain, right (Primary)    2. Bursitis of right foot  -     lidocaine (XYLOCAINE) 1 % injection 0.5 mL  -     dexamethasone sodium phosphate injection 2 mg  -     triamcinolone acetonide (KENALOG-40) injection 20 mg        Comprehensive lower extremity examination and evaluation was performed.    Discussed findings and treatment plan including risks, benefits, and treatment options with patient in detail. Patient agreed with treatment plan.    Medications and allergies reviewed.  Reviewed available lab values along with other pertinent labs.  These were discussed with the patient.    Discussed proper shoegear for the patient's feet and medical condition.  Patient states understanding and agreement with this plan.    Rice Therapy: It is important to treat any injury as soon as possible to help control swelling and increase recovery time. The recognized regimen for immediate treatment of sport injuries includes rest, ice (cold application), compression, and elevation (RICE). Remove the injured athlete from play, apply ice to the affected area, wrap or compress the injured area with an elastic bandage when appropriate, and elevate the injured area above heart level to reduce swelling.  The patient is to not use ice for longer than 20 minutes at a time, with at least 20 minutes of no ice usage between applications.  The patient states understanding and agreement with this plan.    Plantar Fasciitis Injection  Date/Time: 05/31/2022  Performed by: Andrea Stevenson DPM  Authorized by: Andrea Stevenson DPM     Consent: Verbal consent obtained. Written consent obtained.  Risks and benefits: risks, benefits and alternatives were discussed  Consent given by: patient  Patient identity confirmed: verbally with  patient  Indications: pain relief    Injection site: Right lateral midfoot    Sedation:  Patient sedated: no    Patient position: sitting  Needle size: 27 G  Local anesthetic: 0.5 ml 1% Lidocaine plain, 0.5 ml Kenalog 40 mg/ml, and 0.5 ml Decadron 4 mg/mL.   Outcome: pain improved  Patient tolerance: Patient tolerated the procedure well with no immediate complications    Patient may begin to weight bear as tolerated in supportive shoes.  No impact activities for two weeks.  After that time, the patient may increase activities as tolerated. Patient states understanding and agreement with this plan.    An After Visit Summary was printed and given to the patient at discharge, including (if requested) any available informative/educational handouts regarding diagnosis, treatment, or medications. All questions were answered to patient/family satisfaction. Should symptoms fail to improve or worsen they agree to call or return to clinic or to go to the Emergency Department. Discussed the importance of following up with any needed screening tests/labs/specialist appointments and any requested follow-up recommended by me today. Importance of maintaining follow-up discussed and patient accepts that missed appointments can delay diagnosis and potentially lead to worsening of conditions.    Return if symptoms worsen or fail to improve., or sooner if acute issues arise.    This document has been electronically signed by Andrea Stevenson DPM on May 31, 2022 09:01 EDT

## 2022-06-03 ENCOUNTER — PATIENT ROUNDING (BHMG ONLY) (OUTPATIENT)
Dept: PODIATRY | Facility: CLINIC | Age: 67
End: 2022-06-03

## 2022-06-03 NOTE — PROGRESS NOTES
A [employer] message has been sent to the patient for PATIENT ROUNDING with [facility]

## 2022-06-14 ENCOUNTER — APPOINTMENT (OUTPATIENT)
Dept: MAMMOGRAPHY | Facility: HOSPITAL | Age: 67
End: 2022-06-14

## 2022-08-08 ENCOUNTER — HOSPITAL ENCOUNTER (OUTPATIENT)
Dept: MAMMOGRAPHY | Facility: HOSPITAL | Age: 67
Discharge: HOME | End: 2022-08-08
Admitting: NURSE PRACTITIONER

## 2022-08-08 DIAGNOSIS — Z12.31 SCREENING MAMMOGRAM, ENCOUNTER FOR: ICD-10-CM

## 2022-08-08 PROCEDURE — 77063 BREAST TOMOSYNTHESIS BI: CPT

## 2022-08-08 PROCEDURE — 77067 SCR MAMMO BI INCL CAD: CPT

## 2023-01-03 ENCOUNTER — APPOINTMENT (OUTPATIENT)
Dept: CT IMAGING | Facility: HOSPITAL | Age: 68
End: 2023-01-03
Payer: MEDICARE

## 2023-01-03 ENCOUNTER — HOSPITAL ENCOUNTER (EMERGENCY)
Facility: HOSPITAL | Age: 68
Discharge: HOME | End: 2023-01-03
Attending: EMERGENCY MEDICINE | Admitting: EMERGENCY MEDICINE
Payer: MEDICARE

## 2023-01-03 VITALS
WEIGHT: 250.88 LBS | BODY MASS INDEX: 49.26 KG/M2 | OXYGEN SATURATION: 96 % | HEIGHT: 60 IN | DIASTOLIC BLOOD PRESSURE: 89 MMHG | TEMPERATURE: 98.1 F | RESPIRATION RATE: 19 BRPM | SYSTOLIC BLOOD PRESSURE: 138 MMHG | HEART RATE: 86 BPM

## 2023-01-03 DIAGNOSIS — R19.5 FECAL OCCULT BLOOD TEST POSITIVE: ICD-10-CM

## 2023-01-03 DIAGNOSIS — K64.4 EXTERNAL HEMORRHOID: ICD-10-CM

## 2023-01-03 DIAGNOSIS — K57.90 DIVERTICULOSIS: Primary | ICD-10-CM

## 2023-01-03 LAB
ABO GROUP BLD: NORMAL
ALBUMIN SERPL-MCNC: 4.1 G/DL (ref 3.5–5.2)
ALBUMIN/GLOB SERPL: 1.6 G/DL
ALP SERPL-CCNC: 96 U/L (ref 39–117)
ALT SERPL W P-5'-P-CCNC: 34 U/L (ref 1–33)
ANION GAP SERPL CALCULATED.3IONS-SCNC: 8.3 MMOL/L (ref 5–15)
APTT PPP: 27.3 SECONDS (ref 78–95.9)
AST SERPL-CCNC: 31 U/L (ref 1–32)
BASOPHILS # BLD AUTO: 0.02 10*3/MM3 (ref 0–0.2)
BASOPHILS NFR BLD AUTO: 0.2 % (ref 0–1.5)
BILIRUB SERPL-MCNC: 0.2 MG/DL (ref 0–1.2)
BLD GP AB SCN SERPL QL: NEGATIVE
BUN SERPL-MCNC: 26 MG/DL (ref 8–23)
BUN/CREAT SERPL: 37.7 (ref 7–25)
CALCIUM SPEC-SCNC: 9.1 MG/DL (ref 8.6–10.5)
CHLORIDE SERPL-SCNC: 103 MMOL/L (ref 98–107)
CO2 SERPL-SCNC: 26.7 MMOL/L (ref 22–29)
CREAT SERPL-MCNC: 0.69 MG/DL (ref 0.57–1)
D-LACTATE SERPL-SCNC: 1.2 MMOL/L (ref 0.5–2)
DEPRECATED RDW RBC AUTO: 41.1 FL (ref 37–54)
EGFRCR SERPLBLD CKD-EPI 2021: 94.7 ML/MIN/1.73
EOSINOPHIL # BLD AUTO: 0.19 10*3/MM3 (ref 0–0.4)
EOSINOPHIL NFR BLD AUTO: 2 % (ref 0.3–6.2)
ERYTHROCYTE [DISTWIDTH] IN BLOOD BY AUTOMATED COUNT: 12.4 % (ref 12.3–15.4)
GLOBULIN UR ELPH-MCNC: 2.6 GM/DL
GLUCOSE SERPL-MCNC: 98 MG/DL (ref 65–99)
HCT VFR BLD AUTO: 36.2 % (ref 34–46.6)
HEMOCCULT STL QL IA: POSITIVE
HGB BLD-MCNC: 12 G/DL (ref 12–15.9)
HOLD SPECIMEN: NORMAL
HOLD SPECIMEN: NORMAL
IMM GRANULOCYTES # BLD AUTO: 0.03 10*3/MM3 (ref 0–0.05)
IMM GRANULOCYTES NFR BLD AUTO: 0.3 % (ref 0–0.5)
INR PPP: 1.09 (ref 0.86–1.15)
LYMPHOCYTES # BLD AUTO: 2.56 10*3/MM3 (ref 0.7–3.1)
LYMPHOCYTES NFR BLD AUTO: 26.4 % (ref 19.6–45.3)
MCH RBC QN AUTO: 29.9 PG (ref 26.6–33)
MCHC RBC AUTO-ENTMCNC: 33.1 G/DL (ref 31.5–35.7)
MCV RBC AUTO: 90.3 FL (ref 79–97)
MONOCYTES # BLD AUTO: 0.84 10*3/MM3 (ref 0.1–0.9)
MONOCYTES NFR BLD AUTO: 8.7 % (ref 5–12)
NEUTROPHILS NFR BLD AUTO: 6.06 10*3/MM3 (ref 1.7–7)
NEUTROPHILS NFR BLD AUTO: 62.4 % (ref 42.7–76)
NRBC BLD AUTO-RTO: 0 /100 WBC (ref 0–0.2)
PLATELET # BLD AUTO: 214 10*3/MM3 (ref 140–450)
PMV BLD AUTO: 9.5 FL (ref 6–12)
POTASSIUM SERPL-SCNC: 4.2 MMOL/L (ref 3.5–5.2)
PROT SERPL-MCNC: 6.7 G/DL (ref 6–8.5)
PROTHROMBIN TIME: 14.3 SECONDS (ref 11.8–14.9)
RBC # BLD AUTO: 4.01 10*6/MM3 (ref 3.77–5.28)
RH BLD: POSITIVE
SODIUM SERPL-SCNC: 138 MMOL/L (ref 136–145)
T&S EXPIRATION DATE: NORMAL
WBC NRBC COR # BLD: 9.7 10*3/MM3 (ref 3.4–10.8)
WHOLE BLOOD HOLD COAG: NORMAL
WHOLE BLOOD HOLD SPECIMEN: NORMAL

## 2023-01-03 PROCEDURE — 86901 BLOOD TYPING SEROLOGIC RH(D): CPT

## 2023-01-03 PROCEDURE — 85610 PROTHROMBIN TIME: CPT | Performed by: NURSE PRACTITIONER

## 2023-01-03 PROCEDURE — 36415 COLL VENOUS BLD VENIPUNCTURE: CPT

## 2023-01-03 PROCEDURE — 74177 CT ABD & PELVIS W/CONTRAST: CPT

## 2023-01-03 PROCEDURE — 82274 ASSAY TEST FOR BLOOD FECAL: CPT | Performed by: NURSE PRACTITIONER

## 2023-01-03 PROCEDURE — 0 IOPAMIDOL PER 1 ML: Performed by: NURSE PRACTITIONER

## 2023-01-03 PROCEDURE — 86850 RBC ANTIBODY SCREEN: CPT

## 2023-01-03 PROCEDURE — 96374 THER/PROPH/DIAG INJ IV PUSH: CPT

## 2023-01-03 PROCEDURE — 25010000002 ONDANSETRON PER 1 MG: Performed by: NURSE PRACTITIONER

## 2023-01-03 PROCEDURE — 99283 EMERGENCY DEPT VISIT LOW MDM: CPT

## 2023-01-03 PROCEDURE — 96375 TX/PRO/DX INJ NEW DRUG ADDON: CPT

## 2023-01-03 PROCEDURE — 83605 ASSAY OF LACTIC ACID: CPT

## 2023-01-03 PROCEDURE — 86900 BLOOD TYPING SEROLOGIC ABO: CPT

## 2023-01-03 PROCEDURE — 85025 COMPLETE CBC W/AUTO DIFF WBC: CPT

## 2023-01-03 PROCEDURE — 80053 COMPREHEN METABOLIC PANEL: CPT

## 2023-01-03 PROCEDURE — 85730 THROMBOPLASTIN TIME PARTIAL: CPT | Performed by: NURSE PRACTITIONER

## 2023-01-03 RX ORDER — PANTOPRAZOLE SODIUM 40 MG/10ML
40 INJECTION, POWDER, LYOPHILIZED, FOR SOLUTION INTRAVENOUS ONCE
Status: COMPLETED | OUTPATIENT
Start: 2023-01-03 | End: 2023-01-03

## 2023-01-03 RX ORDER — ONDANSETRON 2 MG/ML
4 INJECTION INTRAMUSCULAR; INTRAVENOUS ONCE
Status: COMPLETED | OUTPATIENT
Start: 2023-01-03 | End: 2023-01-03

## 2023-01-03 RX ORDER — SODIUM CHLORIDE 0.9 % (FLUSH) 0.9 %
10 SYRINGE (ML) INJECTION AS NEEDED
Status: DISCONTINUED | OUTPATIENT
Start: 2023-01-03 | End: 2023-01-04 | Stop reason: HOSPADM

## 2023-01-03 RX ADMIN — SODIUM CHLORIDE 1000 ML: 9 INJECTION, SOLUTION INTRAVENOUS at 20:09

## 2023-01-03 RX ADMIN — PANTOPRAZOLE SODIUM 40 MG: 40 INJECTION, POWDER, LYOPHILIZED, FOR SOLUTION INTRAVENOUS at 20:09

## 2023-01-03 RX ADMIN — IOPAMIDOL 100 ML: 755 INJECTION, SOLUTION INTRAVENOUS at 20:45

## 2023-01-03 RX ADMIN — ONDANSETRON 4 MG: 2 INJECTION INTRAMUSCULAR; INTRAVENOUS at 20:09

## 2023-01-03 NOTE — ED PROVIDER NOTES
Time: 5:42 PM EST  Date of encounter:  1/3/2023  Independent Historian/Clinical History and Information was obtained by:   Patient and Family  Chief Complaint   Patient presents with   • Black or Bloody Stool     Pt sent from PCP for GI bleed. Reported blood in stool last night, red transitioning to black.       History is limited by: N/A    History of Present Illness:  Patient is a 68 y.o. year old female who presents to the emergency department for evaluation of rectal bleeding. Patient reports she noticed bright red bleeding and dark red blood that began yesterday. Has had multiple episodes of bleeding with stool.  Patient states she has been having bright red bleeding with her stool, but is noticing that she is passing some clots.  Patient was seen by PCP today and told to come here for further evaluation. Patient denies fever. Reports dizziness, lightheadedness and fatigue. Denies syncopal episodes. Denies anticoagulants. She does report hx of diverticulitis several years ago with possibly bleeding then. She does report some generalized abdominal discomfort.  Patient does report history of hemorrhoids.  Patient does admit to associated nausea with occasional vomiting.  Patient also states that recently she has been constipated.      History provided by:  Patient   used: No        Patient Care Team  Primary Care Provider: Doreen Brewster APRN    Past Medical History:     Allergies   Allergen Reactions   • Meperidine Other (See Comments)   • Oxycodone-Acetaminophen Other (See Comments)   • Tape Other (See Comments)     Paper tape causes blisters     Past Medical History:   Diagnosis Date   • Asthma    • Hypertension    • Pneumonia      Past Surgical History:   Procedure Laterality Date   •  SECTION     • CHOLECYSTECTOMY     • HYSTERECTOMY     • KNEE SURGERY Right    • LAPAROSCOPIC GASTRIC BANDING     • SHOULDER SURGERY Right    • TONSILLECTOMY       Family History   Problem Relation  Age of Onset   • Diabetes Father    • Diabetes Sister    • Asthma Sister    • Diabetes Brother        Home Medications:  Prior to Admission medications    Medication Sig Start Date End Date Taking? Authorizing Provider   ASPIRIN 81 PO Daily.    Nurse Ashley Ceja RN   Cholecalciferol (VITAMIN D3 EXTRA STRENGTH PO) Daily.    Nurse Ashley Ceja RN   Cholecalciferol (Vitamin D3) 50 MCG (2000 UT) capsule Daily.    Kashmir Mike MD   Coenzyme Q10 (COQ10 PO) Daily.    Nurse Ashley Ceja RN   Diclofenac Sodium (VOLTAREN) 1 % gel gel Apply 4 g topically to the appropriate area as directed 3 (Three) Times a Day. 12/27/22   Jamar Bhatia MD   hydroxychloroquine (PLAQUENIL) 200 MG tablet Take 200 mg by mouth. 12/21/22   Kashmir Mike MD   ibuprofen (ADVIL,MOTRIN) 200 MG tablet 2 Orally in am and pm    Kashmir Mike MD   loratadine (CLARITIN) 10 MG tablet  10/13/21   Kashmir Mike MD   magnesium chloride ER 64 MG DR tablet Take  by mouth Daily.    ProviderKashmir MD   Methylsulfonylmethane 1000 MG capsule Take  by mouth.    ProviderKashmir MD   metoprolol tartrate (LOPRESSOR) 25 MG tablet  10/13/21   Nurse Ashley Ceja RN   milk thistle 175 MG tablet Take 175 mg by mouth Daily.    Kashmir Mike MD   NP Thyroid 90 MG tablet Take 90 mg by mouth Daily. 5/23/22   Kashmir Mike MD   POTASSIUM GLUCONATE PO Daily.    Nurse Ashley Ceja RN   Probiotic Product (PROBIOTIC DAILY PO) ONE    Nurse Ashley Ceja RN   rOPINIRole (REQUIP) 0.25 MG tablet  10/13/21   Emergency, Nurse Epic, RN   Sennosides-Docusate Sodium (PERICOLACE) 8.6-50 MG per capsule Every 12 (Twelve) Hours.    Kashmir Mike MD   Symbicort 160-4.5 MCG/ACT inhaler  10/13/21   Nurse Ashley Ceja RN   traZODone (DESYREL) 50 MG tablet  10/13/21   Nurse Ashley Ceja RN        Social History:   Social History     Tobacco Use   • Smoking status: Never   • Smokeless tobacco:  Never   Vaping Use   • Vaping Use: Never used   Substance Use Topics   • Alcohol use: Not Currently   • Drug use: Never         Review of Systems:  Review of Systems   Constitutional: Positive for activity change and fatigue. Negative for fever.   Respiratory: Positive for shortness of breath.    Cardiovascular: Negative for chest pain.   Gastrointestinal: Positive for abdominal pain, blood in stool, constipation, nausea and vomiting.   Genitourinary: Negative for dysuria.   Neurological: Positive for dizziness and light-headedness. Negative for syncope.        Physical Exam:  /89   Pulse 86   Temp 98.1 °F (36.7 °C) (Oral)   Resp 19   Ht 152.4 cm (60\")   Wt 114 kg (250 lb 14.1 oz)   SpO2 96%   BMI 49.00 kg/m²     Physical Exam  Vitals and nursing note reviewed.   Constitutional:       General: She is not in acute distress.     Appearance: Normal appearance. She is obese. She is not ill-appearing, toxic-appearing or diaphoretic.   HENT:      Head: Normocephalic and atraumatic.      Nose: Nose normal.      Mouth/Throat:      Mouth: Mucous membranes are moist.   Eyes:      Extraocular Movements: Extraocular movements intact.      Conjunctiva/sclera: Conjunctivae normal.      Pupils: Pupils are equal, round, and reactive to light.   Cardiovascular:      Rate and Rhythm: Normal rate and regular rhythm.      Heart sounds: Normal heart sounds.   Pulmonary:      Effort: Pulmonary effort is normal. No respiratory distress.      Breath sounds: Normal breath sounds. No stridor. No wheezing, rhonchi or rales.   Abdominal:      General: Abdomen is flat. Bowel sounds are normal. There is no distension.      Palpations: Abdomen is soft. There is no pulsatile mass.      Tenderness: There is abdominal tenderness in the right upper quadrant. There is no guarding.      Hernia: No hernia is present.   Genitourinary:     Rectum: Guaiac result positive. External hemorrhoid present.   Musculoskeletal:         General: Normal  range of motion.      Cervical back: Normal range of motion and neck supple.   Skin:     General: Skin is warm and dry.   Neurological:      General: No focal deficit present.      Mental Status: She is alert and oriented to person, place, and time.   Psychiatric:         Mood and Affect: Mood normal.         Behavior: Behavior normal.         Thought Content: Thought content normal.         Judgment: Judgment normal.                  Procedures:  Procedures      Medical Decision Making:      Comorbidities that affect care:    Asthma, Hypertension    External Notes reviewed:    Previous Labs      The following orders were placed and all results were independently analyzed by me:  Orders Placed This Encounter   Procedures   • CT Abdomen Pelvis With Contrast   • [place] Draw   • Comprehensive Metabolic Panel   • Lactic Acid, Plasma   • CBC Auto Differential   • Occult Blood, Fecal By Immunoassay - Stool, Per Rectum   • Protime-INR   • aPTT   • NPO Diet NPO Type: Strict NPO   • Undress & Gown   • Cardiac Monitoring   • Measure Blood Pressure   • Vital Signs   • Orthostatic Blood Pressure   • Pulse Oximetry   • Oxygen Therapy- Nasal Cannula; 2 LPM; Titrate for SPO2: equal to or greater than, 92%   • Type & Screen   • ABO RH Specimen Verification   • Insert Peripheral IV   • Insert Peripheral IV   • CBC & Differential   • Green Top (Gel)   • Lavender Top   • Gold Top - SST   • Light Blue Top       Medications Given in the Emergency Department:  Medications   sodium chloride 0.9 % flush 10 mL (has no administration in time range)   sodium chloride 0.9 % flush 10 mL (has no administration in time range)   sodium chloride 0.9 % bolus 1,000 mL (1,000 mL Intravenous New Bag 1/3/23 2009)   ondansetron (ZOFRAN) injection 4 mg (4 mg Intravenous Given 1/3/23 2009)   pantoprazole (PROTONIX) injection 40 mg (40 mg Intravenous Given 1/3/23 2009)   iopamidol (ISOVUE-370) 76 % injection 100 mL (100 mL Intravenous Given 1/3/23 2045)         ED Course:    The patient was initially evaluated in the triage area where orders were placed. The patient was later dispositioned by Domenico Messina PA-C.      The patient was advised to stay for completion of workup which includes but is not limited to communication of labs and radiological results, reassessment and plan. The patient was advised that leaving prior to disposition by a provider could result in critical findings that are not communicated to the patient.     ED Course as of 01/03/23 2218 Tue Jan 03, 2023 1742 The patient was seen and examined by Jacki cross APRN, while in triage. Orders placed. Patient is awaiting disposition.   [AR]      ED Course User Index  [AR] Jacki Apodaca APRN       Labs:    Lab Results (last 24 hours)     Procedure Component Value Units Date/Time    CBC & Differential [[id number]]  (Normal) Collected: 01/03/23 1625    Specimen: Blood from Arm, Right Updated: 01/03/23 1638    Narrative:      The following orders were created for panel order CBC & Differential.  Procedure                               Abnormality         Status                     ---------                               -----------         ------                     CBC Auto Differential[[id number]]        Normal              Final result                 Please view results for these tests on the individual orders.    Comprehensive Metabolic Panel [[id number]]  (Abnormal) Collected: 01/03/23 1625    Specimen: Blood from Arm, Right Updated: 01/03/23 1655     Glucose 98 mg/dL      BUN 26 mg/dL      Creatinine 0.69 mg/dL      Sodium 138 mmol/L      Potassium 4.2 mmol/L      Chloride 103 mmol/L      CO2 26.7 mmol/L      Calcium 9.1 mg/dL      Total Protein 6.7 g/dL      Albumin 4.1 g/dL      ALT (SGPT) 34 U/L      AST (SGOT) 31 U/L      Alkaline Phosphatase 96 U/L      Total Bilirubin 0.2 mg/dL      Globulin 2.6 gm/dL      A/G Ratio 1.6 g/dL      BUN/Creatinine Ratio 37.7     Anion Gap 8.3  mmol/L      eGFR 94.7 mL/min/1.73      Comment: National Kidney Foundation and American Society of Nephrology (ASN) Task Force recommended calculation based on the Chronic Kidney Disease Epidemiology Collaboration (CKD-EPI) equation refit without adjustment for race.       Narrative:      GFR Normal >60  Chronic Kidney Disease <60  Kidney Failure <15      Lactic Acid, Plasma [[id number]]  (Normal) Collected: 01/03/23 1625    Specimen: Blood from Arm, Right Updated: 01/03/23 1652     Lactate 1.2 mmol/L     CBC Auto Differential [[id number]]  (Normal) Collected: 01/03/23 1625    Specimen: Blood from Arm, Right Updated: 01/03/23 1638     WBC 9.70 10*3/mm3      RBC 4.01 10*6/mm3      Hemoglobin 12.0 g/dL      Hematocrit 36.2 %      MCV 90.3 fL      MCH 29.9 pg      MCHC 33.1 g/dL      RDW 12.4 %      RDW-SD 41.1 fl      MPV 9.5 fL      Platelets 214 10*3/mm3      Neutrophil % 62.4 %      Lymphocyte % 26.4 %      Monocyte % 8.7 %      Eosinophil % 2.0 %      Basophil % 0.2 %      Immature Grans % 0.3 %      Neutrophils, Absolute 6.06 10*3/mm3      Lymphocytes, Absolute 2.56 10*3/mm3      Monocytes, Absolute 0.84 10*3/mm3      Eosinophils, Absolute 0.19 10*3/mm3      Basophils, Absolute 0.02 10*3/mm3      Immature Grans, Absolute 0.03 10*3/mm3      nRBC 0.0 /100 WBC     Protime-INR [[id number]]  (Normal) Collected: 01/03/23 1625    Specimen: Blood from Arm, Right Updated: 01/03/23 1807     Protime 14.3 Seconds      INR 1.09    Narrative:      Suggested Therapeutic Ranges For Oral Anticoagulant Therapy:  Level of Therapy                      INR Target Range  Standard Dose                            2.0-3.0  High Dose                                2.5-3.5  Patients not receiving anticoagulant  Therapy Normal Range                     0.86-1.15    aPTT [[id number]]  (Abnormal) Collected: 01/03/23 1625    Specimen: Blood from Arm, Right Updated: 01/03/23 1807     PTT 27.3 seconds     Occult Blood, Fecal By Immunoassay - Stool,  Per Rectum [[id number]]  (Abnormal) Collected: 01/03/23 2117    Specimen: Stool from Per Rectum Updated: 01/03/23 2136     Occult Blood, Fecal by Immunoassay Positive           Imaging:    CT Abdomen Pelvis With Contrast    Result Date: 1/3/2023  PROCEDURE: CT ABDOMEN PELVIS W CONTRAST  COMPARISON: 3/19/2009.  INDICATIONS: ABDOMINAL PAIN, ESPECIALLY IN THE RLQ X 1 DAY W/ BLOOD IN STOOL.  TECHNIQUE: After obtaining the patient's consent, 588 CT images were created with non-ionic intravenous contrast material.  No oral contrast agent was administered for the study.  PROTOCOL:   Standard CT imaging protocol performed.    RADIATION:   Total DLP: 1,184.7mGy*cm.   Automated exposure control was utilized to minimize radiation dose. CONTRAST: 100 mL Isovue 370 I.V. LABS:   eGFR: 84.6 mL/min/1.73m^2  FINDINGS: There is diffuse colonic diverticulosis without definite acute diverticulitis.  No mechanical bowel obstruction or pneumoperitoneum.  No acute appendicitis.  No pneumatosis.  No portal or mesenteric venous gas.  No definite acute colitis.  Diffuse hepatic steatosis is seen.  A tiny right hepatic hemangioma is seen superiorly, measuring about 1.2 cm, as on axial image 8 of series 201; it is not significantly changed since the prior 2009 CT study.  No hepatosplenomegaly.  The patient has undergone cholecystectomy.  A gastric band is in place.  There has also been hysterectomy.  These postoperative changes were present previously.  There may be a small hiatal hernia.  The lung bases are clear of acute infiltrate.  Minimal atelectasis and/or fibrosis may be present, especially in the left lung base.  Probably borderline cardiac enlargement is present.  No pleural or pericardial effusion.  No adrenal mass.  No renal/ureteral stones or hydronephrosis or obstructive uropathy.  No acute pyelonephritis.  No acute pancreatitis.  Mild compensatory dilatation involves the biliary tree.  No choledocholithiasis or pneumobilia.  No  ascites.  No acute intraperitoneal or retroperitoneal hemorrhage.  No aneurysmal dilatation of the aortoiliac arterial system.  Minimal atherosclerotic changes are noted.  No definite enlarged intra-abdominal or intrapelvic lymph nodes.  Pubic osteitis is possible.  There are degenerative changes of the imaged spine and the bilateral sacroiliac joints.  No acute fracture or aggressive osseous lesion is suggested.  There is probably a benign intraosseous hemangioma at approximately T12, unchanged since the prior study, and well seen on image 127 of series 203 and adjacent images, estimated at about 1.6 cm in greatest diameter.  There is lateral curvature of the thoracolumbar spine with the convexity to the left, which may be fixed or positional in nature.  This finding is new since the prior exam.  No urinary bladder calculi are seen.  A tiny fat-containing umbilical hernia is present.  It was seen previously.  It does not contain bowel.       No acute findings are appreciated.  There is diffuse colonic diverticulosis without definite acute diverticulitis.  Please see above comments for further detail.     Please note that portions of this note were completed with a voice recognition program.  ZACHARIAH NUÑEZ JR, MD       Electronically Signed and Approved By: ZACHARIAH NUÑEZ JR, MD on 1/03/2023 at 21:33                  Differential Diagnosis and Discussion:      Abdominal Pain: Based on the patient's signs and symptoms, I considered abdominal aortic aneurysm, small bowel obstruction, pancreatitis, acute cholecystitis, acute appendecitis, peptic ulcer disease, gastritis, colitis, endocrine disorders, irritable bowel syndrome and other differential diagnosis an etiology of the patient's abdominal pain.  GI Bleeding: Differential diagnosis includes but is not limited to gastritis, gastric ulcer, stress ulcer, duodenitis, Cheri-Araya tears, esophageal varices, angiodysplasia, aortic enteric fistula, hematologic issues  including thrombocytopenia, GI neoplasm, ulcerative colitis, Crohn's disease, diverticulosis, diverticulitis, hemorrhoids, aortic aneurysm, and polyps    All labs were reviewed and analyzed by me.  CT scan radiology interpretation was reviewed by me.    MDM  Number of Diagnoses or Management Options  Diagnosis management comments: Patient presents to the emergency department today with complaints of rectal bleeding.  CBC is unremarkable and noted to have normal hemoglobin hematocrit.  CMP noted to have a BUN of 26.  Lactic acid is unremarkable.  Occult blood per rectum is positive.  CT abdomen pelvis is noted to have diffuse diverticulosis without acute diverticulitis.  Patient is noted to have small external hemorrhoid noted on physical exam.  Patient is appropriate for discharge at this time and I recommended that she have follow-up colonoscopy.       Amount and/or Complexity of Data Reviewed  Clinical lab tests: reviewed and ordered  Tests in the radiology section of CPT®: reviewed and ordered  Decide to obtain previous medical records or to obtain history from someone other than the patient: yes  Obtain history from someone other than the patient: yes    Risk of Complications, Morbidity, and/or Mortality  Presenting problems: moderate  Diagnostic procedures: moderate  Management options: low    Patient Progress  Patient progress: stable     Patient Care Considerations:    None      Consultants/Shared Management Plan:    None    Social Determinants of Health:    Patient is independent, reliable, and has access to care.       Disposition and Care Coordination:    Discharged: The patient is suitable and stable for discharge with no need for consideration of observation or admission.  I discussed with patient the importance of following up with general surgery to have a colonoscopy completed.    Final diagnoses:   Diverticulosis   Fecal occult blood test positive   External hemorrhoid        ED Disposition     ED  Disposition   Discharge    Condition   Stable    Comment   --             This medical record created using voice recognition software.           Domenico Messina PA-C  01/03/23 0703

## 2023-01-04 NOTE — DISCHARGE INSTRUCTIONS
Please schedule appointment with Dr. Garces to schedule a colonoscopy.  Your CT completed in the emergency department today is negative for any acute findings, but you are noted to have diverticulosis.  Return to the emergency department for worsening symptoms.

## 2023-01-17 ENCOUNTER — OFFICE VISIT (OUTPATIENT)
Dept: NEUROSURGERY | Facility: CLINIC | Age: 68
End: 2023-01-17
Payer: MEDICARE

## 2023-01-17 VITALS
DIASTOLIC BLOOD PRESSURE: 51 MMHG | HEART RATE: 80 BPM | BODY MASS INDEX: 49 KG/M2 | HEIGHT: 60 IN | SYSTOLIC BLOOD PRESSURE: 90 MMHG

## 2023-01-17 DIAGNOSIS — G89.29 CHRONIC PAIN OF RIGHT KNEE: ICD-10-CM

## 2023-01-17 DIAGNOSIS — M25.561 CHRONIC PAIN OF RIGHT KNEE: ICD-10-CM

## 2023-01-17 DIAGNOSIS — M54.50 CHRONIC MIDLINE LOW BACK PAIN WITHOUT SCIATICA: Primary | ICD-10-CM

## 2023-01-17 DIAGNOSIS — G89.29 CHRONIC MIDLINE LOW BACK PAIN WITHOUT SCIATICA: Primary | ICD-10-CM

## 2023-01-17 PROCEDURE — 99203 OFFICE O/P NEW LOW 30 MIN: CPT | Performed by: NEUROLOGICAL SURGERY

## 2023-01-17 RX ORDER — LANOLIN ALCOHOL/MO/W.PET/CERES
1000 CREAM (GRAM) TOPICAL DAILY
COMMUNITY

## 2023-01-17 NOTE — PROGRESS NOTES
"Chief Complaint  Back Pain    Subjective          Cris Cid who is a 68 y.o. year old female who presents to [facility] for Evaluation of the Spine.     The patient complains of pain located in the lumbar spine.  Patients states the pain has been present for several years.  The pain came on gradually.  The pain scale level is 6.  The pain radiates around the waist and around the knees. The back pain is greater than the leg pain.  The pain is waxing/waning and described as sharp.  The pain is worse at no particular time of day. Patient states prolonged standing, prolonged sitting, prolonged walking, bending, twisting and lifting makes the pain worse.  Patient states NSAIDs and tylenol together makes the pain better.    Associated Symptoms Include: Numbness intermittent into the legs  Conservative Interventions Include: NSAIDS and tylenol, she has not done PT/core exercises    Was this the result of an injury or accident?: Yes, multiple falls    History of Previous Spinal Surgery?: No    This patient  reports that she has never smoked. She has never used smokeless tobacco.    Review of Systems   Musculoskeletal: Positive for arthralgias, back pain and myalgias.        Objective   Vital Signs:   BP 90/51 (BP Location: Left arm, Patient Position: Sitting)   Pulse 80   Ht 152.4 cm (60\")   BMI 49.00 kg/m²       Physical Exam  Constitutional:       Appearance: She is obese.   Pulmonary:      Effort: Pulmonary effort is normal.   Musculoskeletal:         General: No tenderness.   Neurological:      Mental Status: She is alert.      Sensory: Sensory deficit (decreased to light touch on the lateral calf of the bilateral legs) present.      Motor: No weakness.      Deep Tendon Reflexes: Reflexes abnormal (absent at bilateral knees/ankles).   Psychiatric:         Mood and Affect: Mood normal.          Result Review :   I personally reviewed the patient's MRI scan which shows " multilevel DDD with some foraminal narrowing at L5-S1 most notably. Minimal anterolisthesis at L4-5.       Assessment and Plan    Diagnoses and all orders for this visit:    1. Chronic midline low back pain without sciatica (Primary)    2. Chronic pain of right knee      She would not benefit from surgery on the lower back.    We discussed the importance of core strengthening, avoidance of activities that worsen the pain, nicotine cessation and maintenance of healthy weight. Surgery for patients with multilevel degenerative disc disease is not typically successful with the risks outweighing the benefit.       Follow Up   No follow-ups on file.  Patient was given instructions and counseling regarding her condition or for health maintenance advice. Please see specific information pulled into the AVS if appropriate.

## 2023-02-06 ENCOUNTER — CLINICAL SUPPORT (OUTPATIENT)
Dept: GASTROENTEROLOGY | Facility: CLINIC | Age: 68
End: 2023-02-06

## 2023-02-06 ENCOUNTER — TELEPHONE (OUTPATIENT)
Dept: GASTROENTEROLOGY | Facility: CLINIC | Age: 68
End: 2023-02-06
Payer: MEDICARE

## 2023-02-06 ENCOUNTER — PREP FOR SURGERY (OUTPATIENT)
Dept: OTHER | Facility: HOSPITAL | Age: 68
End: 2023-02-06
Payer: MEDICARE

## 2023-02-06 DIAGNOSIS — K62.5 RECTAL BLEEDING: Primary | ICD-10-CM

## 2023-02-06 RX ORDER — MULTIPLE VITAMINS W/ MINERALS TAB 9MG-400MCG
1 TAB ORAL DAILY
COMMUNITY

## 2023-02-06 RX ORDER — ACETAMINOPHEN 500 MG
TABLET ORAL
COMMUNITY

## 2023-02-06 NOTE — TELEPHONE ENCOUNTER
Cris Cid  REASON FOR CALL -RECTAL BLEED    SENT IN PREP 4L    PROCEDURE DATE: 2023    CLEARANCE NEEDED: PULMONARY -DR CHASE WALKER    Past Medical History:   Diagnosis Date   • Asthma    • Hypertension    • Osteoarthritis    • Pneumonia    • Sleep apnea      Allergies   Allergen Reactions   • Tape Other (See Comments)     Paper tape causes blisters     Past Surgical History:   Procedure Laterality Date   •  SECTION     • CHOLECYSTECTOMY     • HYSTERECTOMY     • KNEE SURGERY Right    • LAPAROSCOPIC GASTRIC BANDING     • SHOULDER SURGERY Right    • TONSILLECTOMY       Social History     Socioeconomic History   • Marital status:    Tobacco Use   • Smoking status: Never   • Smokeless tobacco: Never   Vaping Use   • Vaping Use: Never used   Substance and Sexual Activity   • Alcohol use: Not Currently   • Drug use: Never   • Sexual activity: Defer     Family History   Problem Relation Age of Onset   • Diabetes Father    • Diabetes Sister    • Asthma Sister    • Diabetes Brother    • Colon cancer Neg Hx        Current Outpatient Medications:   •  acetaminophen (TYLENOL) 500 MG tablet, 1-2 orally at bedtime, Disp: , Rfl:   •  ASPIRIN 81 PO, Daily., Disp: , Rfl:   •  Cholecalciferol (Vitamin D3) 50 MCG (2000) capsule, Daily., Disp: , Rfl:   •  Coenzyme Q10 (COQ10 PO), Daily., Disp: , Rfl:   •  Diclofenac Sodium (VOLTAREN) 1 % gel gel, Apply 4 g topically to the appropriate area as directed 3 (Three) Times a Day., Disp: 350 g, Rfl: 1  •  hydroxychloroquine (PLAQUENIL) 200 MG tablet, Take 200 mg by mouth., Disp: , Rfl:   •  ibuprofen (ADVIL,MOTRIN) 200 MG tablet, 2 Orally in am and pm, Disp: , Rfl:   •  loratadine (CLARITIN) 10 MG tablet, , Disp: , Rfl:   •  magnesium chloride ER 64 MG DR tablet, Take  by mouth Daily., Disp: , Rfl:   •  Methylsulfonylmethane 1000 MG capsule, Take  by mouth., Disp: , Rfl:   •  metoprolol tartrate (LOPRESSOR) 25 MG tablet, , Disp: , Rfl:   •  milk thistle 175 MG  tablet, Take 175 mg by mouth Daily., Disp: , Rfl:   •  multivitamin with minerals (OSTEOPRIME PLUS PO), Take 1 tablet by mouth Daily., Disp: , Rfl:   •  NP Thyroid 90 MG tablet, Take 90 mg by mouth Daily., Disp: , Rfl:   •  POTASSIUM GLUCONATE PO, Daily., Disp: , Rfl:   •  Probiotic Product (PROBIOTIC DAILY PO), ONE, Disp: , Rfl:   •  rOPINIRole (REQUIP) 0.25 MG tablet, , Disp: , Rfl:   •  Sennosides-Docusate Sodium (PERICOLACE) 8.6-50 MG per capsule, Every 12 (Twelve) Hours., Disp: , Rfl:   •  Symbicort 160-4.5 MCG/ACT inhaler, , Disp: , Rfl:   •  traZODone (DESYREL) 50 MG tablet, , Disp: , Rfl:   •  vitamin B-12 (CYANOCOBALAMIN) 1000 MCG tablet, Take 1,000 mcg by mouth Daily., Disp: , Rfl:

## 2023-02-07 PROBLEM — K62.5 RECTAL BLEEDING: Status: ACTIVE | Noted: 2023-02-07

## 2023-02-14 ENCOUNTER — TREATMENT (OUTPATIENT)
Dept: PHYSICAL THERAPY | Facility: CLINIC | Age: 68
End: 2023-02-14
Payer: MEDICARE

## 2023-02-14 DIAGNOSIS — R29.898 WEAKNESS OF BOTH LOWER EXTREMITIES: ICD-10-CM

## 2023-02-14 DIAGNOSIS — M53.86 DECREASED RANGE OF MOTION OF LUMBAR SPINE: ICD-10-CM

## 2023-02-14 DIAGNOSIS — M54.50 CHRONIC LOW BACK PAIN, UNSPECIFIED BACK PAIN LATERALITY, UNSPECIFIED WHETHER SCIATICA PRESENT: Primary | ICD-10-CM

## 2023-02-14 DIAGNOSIS — G89.29 CHRONIC LOW BACK PAIN, UNSPECIFIED BACK PAIN LATERALITY, UNSPECIFIED WHETHER SCIATICA PRESENT: Primary | ICD-10-CM

## 2023-02-14 PROCEDURE — 97110 THERAPEUTIC EXERCISES: CPT | Performed by: PHYSICAL THERAPIST

## 2023-02-14 PROCEDURE — 97161 PT EVAL LOW COMPLEX 20 MIN: CPT | Performed by: PHYSICAL THERAPIST

## 2023-02-14 NOTE — PROGRESS NOTES
Physical Therapy Initial Evaluation and Plan of Care  [address] [place] [id number]    Patient: Cris Cid   : 1955  Diagnosis/ICD-10 Code:  Chronic low back pain, unspecified back pain laterality, unspecified whether sciatica present [M54.50, G89.29]  Referring practitioner: Drew Alva MD  Date of Initial Visit: 2023  Today's Date: 2023  Patient seen for 1 sessions           Subjective Questionnaire: Oswestry:        Subjective Evaluation    History of Present Illness  Mechanism of injury: Pt presents to PT with chronic low back pain. Pt reports she has had several years of low back pain with the most recent increase starting in the last 1-2 years ago. Pt reports with prolonged standing and walking she will get a band like feeling along her low back. Pt reports her R knee also causes her pain but she fractured her patella 5-7 years ago. Pt reports she has increase in difficulty getting her socks on. Pt reports her back bothers her more then her knee at this time. Pt takes ibuprofen and tylenol 2x a day and sits her recliner with vibration and heat for pain relief.  Pt has had lumbar MRI revealing degeneration in lumbar spine. Of most importance, patient's  is a C6 spinal cord injury and patient has to transfer  into and out of bed each day and patient is also responsible for care of her  and getting him to and from therapy. Pt's SCI occurred in 2021 and patient reports she did notice the increase in low back pain around this time.       Patient Occupation: Caretaker for   Pain  Current pain ratin  At best pain ratin  At worst pain ratin  Quality: sharp, tight, discomfort and dull ache  Relieving factors: medications and heat  Aggravating factors: ambulation and standing    Patient Goals  Patient goals for therapy: decreased pain and increased strength             Objective          Postural Observations    Additional Postural  Observation Details  Forward head and rounded shoulders     Palpation   Left   Tenderness of the erector spinae and quadratus lumborum.     Right Tenderness of the erector spinae and quadratus lumborum.     Tenderness     Additional Tenderness Details  Increase in pain with PA's to whole lumbar spine with most increase in pain being with lower lumbar spine     Neurological Testing     Sensation     Lumbar   Left   Intact: light touch    Right   Intact: light touch    Active Range of Motion     Additional Active Range of Motion Details  Pt is limited with forward lumbar flexion by 25% and has increase in pain with return back to neutral.  Pt has full lumbar extension  Pt is limited with SB to R and L by 50% with increase in pain  Pt has full lumbar rotation bilaterally    Strength/Myotome Testing     Left Hip   Planes of Motion   Flexion: 3+  Extension: 3  Abduction: 3+  Adduction: 3+    Right Hip   Planes of Motion   Flexion: 3+  Extension: 3  Abduction: 3+  Adduction: 3+    Left Knee   Flexion: 4+  Extension: 4+    Right Knee   Flexion: 4  Extension: 4    Left Ankle/Foot   Dorsiflexion: 5    Right Ankle/Foot   Dorsiflexion: 5        See Exercise, Manual, and Modality Logs for complete treatment.       Assessment & Plan     Assessment  Impairments: abnormal or restricted ROM, activity intolerance, impaired balance, impaired physical strength, lacks appropriate home exercise program, pain with function and weight-bearing intolerance  Functional Limitations: lifting, walking, uncomfortable because of pain and standing  Assessment details: Pt presents to PT with chronic low back pain with the most recent increase being in the last 1-2 years. Pt presents with significant LE and core weakness, decrease in lumbar ROM, and increase in LBP with functional mobility such as STS and bending forward. 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 Pt requires skilled PT in order to address deficits listed in order to return patient back to maximum function.   Prognosis: good    Goals  Plan Goals: LUMBAR PROBLEMS:    1. The patient has complaints of pain.    LTG 1: 12 weeks: The patient will report lower back pain no greater than 2/10 in order to improve tolerance with activities of daily living and improve sleep quality.    STATUS: New    STG 1a: 6 weeks: The patient will report lower back pain no greater than 4/10.     STATUS: New      2. The patient has limited lumbar spine ROM.    LTG 2: 12 weeks: The patient will improve lumbar spine ROM to 100% in order to improve tolerance with activities of daily living.    STATUS: New      STG 2a: 6 weeks: The patient will improve lumbar spine ROM to 75% in order to improve tolerance with activities of daily living.    STATUS: New    3. The patient demonstrates weakness of the bilateral hip.    LTG 3: 12 weeks: The patient will demonstrate 5/5 strength for bilateral hip flexion, abduction, and extension in order to improve hip stability.    STATUS: New      STG 3a: 6 weeks: The patient will demonstrate 4+/5 strength for bilateral hip flexion, abduction, and extension.    STATUS: New      Mobility: Walking/Moving Around Functional Limitation    LTG 4: 12 weeks: The patient will demonstrate 1-19% limitation by achieving a score of 10 on the Oswestry Disability Questionnaire.    STATUS: New    STG 4a: 6 weeks: The patient will demonstrate 1-19% limitation by achieving a score of 20-39% on the Oswestry Disability Quiestionnaire.    STATUS: New    Plan  Therapy options: will be seen for skilled therapy services  Planned modality interventions: cryotherapy, TENS, dry needling and thermotherapy (hydrocollator packs)  Planned therapy interventions: manual therapy, neuromuscular re-education, ADL retraining, balance/weight-bearing training, flexibility, functional ROM exercises, gait training,  home exercise program, joint mobilization, soft tissue mobilization, strengthening, stretching, therapeutic activities, abdominal trunk stabilization, postural training and spinal/joint mobilization  Frequency: 2x week  Duration in weeks: 12  Treatment plan discussed with: patient        History # of Personal Factors and/or Comorbidities: LOW (0)  Examination of Body System(s): # of elements: LOW (1-2)  Clinical Presentation: STABLE   Clinical Decision Making: LOW       Timed:         Manual Therapy:    0     mins  20470;     Therapeutic Exercise:    8     mins  76311;     Neuromuscular Madhu:    0    mins  38829;    Therapeutic Activity:     0     mins  75250;     Gait Trainin     mins  74756;     Ultrasound:     0     mins  62979;    Ionto                               0    mins   09919  Self pay                         0     mins [id number]    Un-Timed:  Electrical Stimulation:    0     mins  98130 (MC )  Traction     0     mins 07931  Low Eval     35     Mins  88586  Mod Eval     0     Mins  10637  High Eval                       0     Mins  23971  Self Pay Eval                 0     PTSP1   Re-Eval                           0    mins  61078        Timed Treatment:   8   mins   Total Treatment:     43   mins    PT SIGNATURE: Electronically signed by Doug Padgett [facility] LICENSE: [id number]    DATE TREATMENT INITIATED: 2023    Initial Certification  Certification Period: 2023 thru 2023  I certify that the therapy services are furnished while this patient is under my care.  The services outlined above are required by this patient, and will be reviewed every 90 days.     PHYSICIAN: Drew Alva MD   NPI: [id number]      DATE:     Please sign and return via fax to [phone] Thank you, [facility] Physical Therapy.

## 2023-02-17 ENCOUNTER — TREATMENT (OUTPATIENT)
Dept: PHYSICAL THERAPY | Facility: CLINIC | Age: 68
End: 2023-02-17
Payer: MEDICARE

## 2023-02-17 DIAGNOSIS — M54.50 CHRONIC LOW BACK PAIN, UNSPECIFIED BACK PAIN LATERALITY, UNSPECIFIED WHETHER SCIATICA PRESENT: Primary | ICD-10-CM

## 2023-02-17 DIAGNOSIS — M53.86 DECREASED RANGE OF MOTION OF LUMBAR SPINE: ICD-10-CM

## 2023-02-17 DIAGNOSIS — G89.29 CHRONIC LOW BACK PAIN, UNSPECIFIED BACK PAIN LATERALITY, UNSPECIFIED WHETHER SCIATICA PRESENT: Primary | ICD-10-CM

## 2023-02-17 DIAGNOSIS — R29.898 WEAKNESS OF BOTH LOWER EXTREMITIES: ICD-10-CM

## 2023-02-17 PROCEDURE — 97530 THERAPEUTIC ACTIVITIES: CPT | Performed by: PHYSICAL THERAPIST

## 2023-02-17 PROCEDURE — 97110 THERAPEUTIC EXERCISES: CPT | Performed by: PHYSICAL THERAPIST

## 2023-02-17 NOTE — PROGRESS NOTES
Physical Therapy Daily Treatment Note      Patient: Cris Cid   : 1955  Referring practitioner: Drew Alva MD  Date of Initial Visit: Type: THERAPY  Noted: 2023  Today's Date: 2023  Patient seen for 2 sessions           Subjective Questionnaire:       Subjective Evaluation    History of Present Illness    Subjective comment: When asked how her back felt pt reported it felt tight.         Objective   See Exercise, Manual, and Modality Logs for complete treatment.       Assessment & Plan     Assessment    Assessment details: Pt tolerated table exercise well and added standing strengthening. Pt required sitting rest break to catch her breath sitting and standing exercise.  Pt will benefit from continued PT strengthen core and decrease low back pain aiding in improved functional daily activity.  Continue with POC.        Visit Diagnoses:    ICD-10-CM ICD-9-CM   1. Chronic low back pain, unspecified back pain laterality, unspecified whether sciatica present  M54.50 724.2    G89.29 338.29   2. Weakness of both lower extremities  R29.898 729.89   3. Decreased range of motion of lumbar spine  M53.86 724.9       Progress per Plan of Care and Progress strengthening /stabilization /functional activity           Timed:  Manual Therapy:         mins  97174;  Therapeutic Exercise:    20     mins  67482;     Neuromuscular Madhu:        mins  41849;    Therapeutic Activity:     8     mins  58320;     Gait Training:           mins  71791;     Ultrasound:          mins  19510;    Electrical Stimulation:         mins  75695 ( );  Aquatic Therapy          mins  26921    Untimed:  Electrical Stimulation:         mins  16500 ( );  Mechanical Traction:         mins  54788;     Timed Treatment:   28   mins   Total Treatment:     28   mins    Electronically signed    Lynne Walters PTA  Physical Therapist Assistant    [facility] license: [id number]

## 2023-02-20 ENCOUNTER — TELEPHONE (OUTPATIENT)
Dept: PHYSICAL THERAPY | Facility: CLINIC | Age: 68
End: 2023-02-20

## 2023-02-22 ENCOUNTER — TREATMENT (OUTPATIENT)
Dept: PHYSICAL THERAPY | Facility: CLINIC | Age: 68
End: 2023-02-22
Payer: MEDICARE

## 2023-02-22 DIAGNOSIS — M54.50 CHRONIC LOW BACK PAIN, UNSPECIFIED BACK PAIN LATERALITY, UNSPECIFIED WHETHER SCIATICA PRESENT: Primary | ICD-10-CM

## 2023-02-22 DIAGNOSIS — G89.29 CHRONIC LOW BACK PAIN, UNSPECIFIED BACK PAIN LATERALITY, UNSPECIFIED WHETHER SCIATICA PRESENT: Primary | ICD-10-CM

## 2023-02-22 DIAGNOSIS — M53.86 DECREASED RANGE OF MOTION OF LUMBAR SPINE: ICD-10-CM

## 2023-02-22 DIAGNOSIS — R29.898 WEAKNESS OF BOTH LOWER EXTREMITIES: ICD-10-CM

## 2023-02-22 PROCEDURE — 97112 NEUROMUSCULAR REEDUCATION: CPT | Performed by: PHYSICAL THERAPIST

## 2023-02-22 PROCEDURE — 97110 THERAPEUTIC EXERCISES: CPT | Performed by: PHYSICAL THERAPIST

## 2023-02-22 NOTE — PROGRESS NOTES
Outpatient Physical Therapy  [address] [facility], [place] [address]                            Physical Therapy Daily Treatment Note    Patient: Cris Cid   : 1955  Diagnosis/ICD-10 Code:  Chronic low back pain, unspecified back pain laterality, unspecified whether sciatica present [M54.50, G89.29]  Referring practitioner: Drew Alva MD  Date of Initial Visit: Type: THERAPY  Noted: 2023  Today's Date: 2023  Patient seen for 3 sessions             Subjective   Cris Cid reports: that her back was not good this morning, states that the pain is right in the middle of the back not necessarily down the legs.     Objective   Minimal discomfort in low back throughout PT session.    See Exercise, Manual, and Modality Logs for complete treatment.     Assessment/Plan  Cris still experiencing increased low back  pain, although just middle of the back. Pt had some increased discomfort with majority of PT exercises. Pt would benefit from skilled PT to address Range of Motion  and Strength deficits, pain management and any concerns with ADLs. .      Progress per Plan of Care         Timed:  Manual Therapy:         mins  16299;  Therapeutic Exercise:    20     mins  62944;     Neuromuscular Madhu:    10    mins  24459;    Therapeutic Activity:          mins  87370;     Gait Training:           mins  51707;    Aquatic Therapy:          mins  46914;       Untimed:  Electrical Stimulation:         mins  45586 ( );  Mechanical Traction:         mins  35185;       Timed Treatment:   30   mins   Total Treatment:     30   mins      Electronically signed:   Shaye Ritter PTA  Physical Therapist Assistant  [facility] License #: [id number]

## 2023-03-01 ENCOUNTER — TREATMENT (OUTPATIENT)
Dept: PHYSICAL THERAPY | Facility: CLINIC | Age: 68
End: 2023-03-01
Payer: MEDICARE

## 2023-03-01 DIAGNOSIS — R29.898 WEAKNESS OF BOTH LOWER EXTREMITIES: ICD-10-CM

## 2023-03-01 DIAGNOSIS — M54.50 CHRONIC LOW BACK PAIN, UNSPECIFIED BACK PAIN LATERALITY, UNSPECIFIED WHETHER SCIATICA PRESENT: Primary | ICD-10-CM

## 2023-03-01 DIAGNOSIS — G89.29 CHRONIC LOW BACK PAIN, UNSPECIFIED BACK PAIN LATERALITY, UNSPECIFIED WHETHER SCIATICA PRESENT: Primary | ICD-10-CM

## 2023-03-01 DIAGNOSIS — M53.86 DECREASED RANGE OF MOTION OF LUMBAR SPINE: ICD-10-CM

## 2023-03-01 PROCEDURE — 97530 THERAPEUTIC ACTIVITIES: CPT | Performed by: PHYSICAL THERAPIST

## 2023-03-01 PROCEDURE — 97110 THERAPEUTIC EXERCISES: CPT | Performed by: PHYSICAL THERAPIST

## 2023-03-01 NOTE — PROGRESS NOTES
Physical Therapy Daily Treatment Note      Patient: Cris Cid   : 1955  Referring practitioner: Drew Alva MD  Date of Initial Visit: Type: THERAPY  Noted: 2023  Today's Date: 3/1/2023  Patient seen for 4 sessions           Subjective Questionnaire:       Subjective Evaluation    History of Present Illness    Subjective comment: Pt reports she was up and down over the weekend and her L hip really bothered her getting to 3/10.  Pt reports her pain is not bad today.       Objective   See Exercise, Manual, and Modality Logs for complete treatment.       Assessment & Plan     Assessment    Assessment details: Pt reported not having any pain initially then reported L hip pain with supine to sit transfer then reported increased pain with standing therapeutic exercise.  Pt had some relief with lumbar press and stretch with large red physioball.  Pt stated she had performed her HEP some but not like she should and was encouraged to do so. Pt ambulates with slight antalgic gait and will benefit from continued PT.        Visit Diagnoses:    ICD-10-CM ICD-9-CM   1. Chronic low back pain, unspecified back pain laterality, unspecified whether sciatica present  M54.50 724.2    G89.29 338.29   2. Weakness of both lower extremities  R29.898 729.89   3. Decreased range of motion of lumbar spine  M53.86 724.9       Progress per Plan of Care and Progress strengthening /stabilization /functional activity           Timed:  Manual Therapy:         mins  16727;  Therapeutic Exercise:    12     mins  21159;     Neuromuscular Madhu:        mins  78499;    Therapeutic Activity:     12     mins  61121;     Gait Training:           mins  11927;     Ultrasound:          mins  25238;    Electrical Stimulation:         mins  88409 ( );  Aquatic Therapy          mins  22417    Untimed:  Electrical Stimulation:         mins  38884 ( );  Mechanical Traction:         mins  55329;     Timed Treatment:   24   mins    Total Treatment:     24   mins    Electronically signed    Lynne Walters PTA  Physical Therapist Assistant    [facility] license: [id number]

## 2023-03-03 ENCOUNTER — TELEPHONE (OUTPATIENT)
Dept: PHYSICAL THERAPY | Facility: CLINIC | Age: 68
End: 2023-03-03

## 2023-03-08 ENCOUNTER — TELEPHONE (OUTPATIENT)
Dept: PHYSICAL THERAPY | Facility: CLINIC | Age: 68
End: 2023-03-08

## 2023-03-10 ENCOUNTER — TELEPHONE (OUTPATIENT)
Dept: PHYSICAL THERAPY | Facility: CLINIC | Age: 68
End: 2023-03-10

## 2023-03-10 NOTE — TELEPHONE ENCOUNTER
Left VM for patient in regards to missing appointment today and the need to reschedule next appointment due to patient needing a re-evaluation performed. Left message for patient to call back to reschedule that appointment.

## 2023-04-19 NOTE — PRE-PROCEDURE INSTRUCTIONS
Second attempt at PAT call.    Left message with a call-back number and the arrival-time of 0900.      Instructed to follow directions from the office to prepare for this appointment.      Hold all medications the morning of the procedure.  Instructed to bring all medications and any inhalers to the hospital on the day of the procedure.      Take any nebulizer treatments on the morning of the procedure as well.    Instructed that a licensed  will be needed for post-procedure transport home.    Delia Montes RN

## 2023-04-21 ENCOUNTER — ANESTHESIA EVENT (OUTPATIENT)
Dept: GASTROENTEROLOGY | Facility: HOSPITAL | Age: 68
End: 2023-04-21
Payer: MEDICARE

## 2023-04-21 ENCOUNTER — ANESTHESIA (OUTPATIENT)
Dept: GASTROENTEROLOGY | Facility: HOSPITAL | Age: 68
End: 2023-04-21
Payer: MEDICARE

## 2023-04-21 ENCOUNTER — HOSPITAL ENCOUNTER (OUTPATIENT)
Facility: HOSPITAL | Age: 68
Setting detail: HOSPITAL OUTPATIENT SURGERY
Discharge: HOME | End: 2023-04-21
Attending: INTERNAL MEDICINE | Admitting: INTERNAL MEDICINE
Payer: MEDICARE

## 2023-04-21 ENCOUNTER — TELEPHONE (OUTPATIENT)
Dept: GASTROENTEROLOGY | Facility: CLINIC | Age: 68
End: 2023-04-21
Payer: MEDICARE

## 2023-04-21 VITALS
DIASTOLIC BLOOD PRESSURE: 77 MMHG | RESPIRATION RATE: 20 BRPM | SYSTOLIC BLOOD PRESSURE: 131 MMHG | WEIGHT: 253.53 LBS | OXYGEN SATURATION: 95 % | TEMPERATURE: 98.1 F | HEIGHT: 61 IN | HEART RATE: 72 BPM | BODY MASS INDEX: 47.87 KG/M2

## 2023-04-21 PROCEDURE — 45378 DIAGNOSTIC COLONOSCOPY: CPT | Performed by: INTERNAL MEDICINE

## 2023-04-21 PROCEDURE — 25010000002 PROPOFOL 10 MG/ML EMULSION: Performed by: NURSE ANESTHETIST, CERTIFIED REGISTERED

## 2023-04-21 RX ORDER — MONTELUKAST SODIUM 10 MG/1
10 TABLET ORAL NIGHTLY
COMMUNITY

## 2023-04-21 RX ORDER — LIDOCAINE HYDROCHLORIDE 20 MG/ML
INJECTION, SOLUTION EPIDURAL; INFILTRATION; INTRACAUDAL; PERINEURAL AS NEEDED
Status: DISCONTINUED | OUTPATIENT
Start: 2023-04-21 | End: 2023-04-21 | Stop reason: SURG

## 2023-04-21 RX ORDER — SODIUM CHLORIDE, SODIUM LACTATE, POTASSIUM CHLORIDE, CALCIUM CHLORIDE 600; 310; 30; 20 MG/100ML; MG/100ML; MG/100ML; MG/100ML
30 INJECTION, SOLUTION INTRAVENOUS CONTINUOUS
Status: DISCONTINUED | OUTPATIENT
Start: 2023-04-21 | End: 2023-04-21 | Stop reason: SDUPTHER

## 2023-04-21 RX ORDER — SODIUM CHLORIDE, SODIUM LACTATE, POTASSIUM CHLORIDE, CALCIUM CHLORIDE 600; 310; 30; 20 MG/100ML; MG/100ML; MG/100ML; MG/100ML
30 INJECTION, SOLUTION INTRAVENOUS CONTINUOUS
Status: DISCONTINUED | OUTPATIENT
Start: 2023-04-21 | End: 2023-04-21 | Stop reason: HOSPADM

## 2023-04-21 RX ORDER — SODIUM CHLORIDE, SODIUM LACTATE, POTASSIUM CHLORIDE, CALCIUM CHLORIDE 600; 310; 30; 20 MG/100ML; MG/100ML; MG/100ML; MG/100ML
INJECTION, SOLUTION INTRAVENOUS CONTINUOUS PRN
Status: DISCONTINUED | OUTPATIENT
Start: 2023-04-21 | End: 2023-04-21 | Stop reason: SURG

## 2023-04-21 RX ORDER — PROPOFOL 10 MG/ML
VIAL (ML) INTRAVENOUS AS NEEDED
Status: DISCONTINUED | OUTPATIENT
Start: 2023-04-21 | End: 2023-04-21 | Stop reason: SURG

## 2023-04-21 RX ADMIN — SODIUM CHLORIDE, POTASSIUM CHLORIDE, SODIUM LACTATE AND CALCIUM CHLORIDE: 600; 310; 30; 20 INJECTION, SOLUTION INTRAVENOUS at 10:24

## 2023-04-21 RX ADMIN — PROPOFOL 100 MG: 10 INJECTION, EMULSION INTRAVENOUS at 10:25

## 2023-04-21 RX ADMIN — SODIUM CHLORIDE, POTASSIUM CHLORIDE, SODIUM LACTATE AND CALCIUM CHLORIDE 30 ML/HR: 600; 310; 30; 20 INJECTION, SOLUTION INTRAVENOUS at 09:34

## 2023-04-21 RX ADMIN — LIDOCAINE HYDROCHLORIDE 100 MG: 20 INJECTION, SOLUTION EPIDURAL; INFILTRATION; INTRACAUDAL; PERINEURAL at 10:25

## 2023-04-21 RX ADMIN — PROPOFOL 200 MCG/KG/MIN: 10 INJECTION, EMULSION INTRAVENOUS at 10:25

## 2023-04-21 NOTE — TELEPHONE ENCOUNTER
"Pt called in regards to her endo procedure, states \"she was too constipated, procedure could not be completed and was told to call office to r/s for a month and wanted to discuss alt prep\", pt aware once we get those orders we should contact pt to schedule, I had not CR to schedule her, she can discuss prep at that time, pt verbalized understanding, pt aware if she has not heard from us by Wednesday to call us  "

## 2023-04-21 NOTE — H&P
Pre Procedure History & Physical    Chief Complaint:   Rectal bleeding    Subjective     HPI:   Rectal bleeding    Past Medical History:   Past Medical History:   Diagnosis Date   • Asthma    • Hypertension    • Osteoarthritis    • Pneumonia    • Sleep apnea        Past Surgical History:  Past Surgical History:   Procedure Laterality Date   •  SECTION     • CHOLECYSTECTOMY     • HYSTERECTOMY     • KNEE SURGERY Right    • LAPAROSCOPIC GASTRIC BANDING     • SHOULDER SURGERY Right    • TONSILLECTOMY         Family History:  Family History   Problem Relation Age of Onset   • Diabetes Father    • Diabetes Sister    • Asthma Sister    • Diabetes Brother    • Colon cancer Neg Hx        Social History:   reports that she has never smoked. She has never used smokeless tobacco. She reports that she does not currently use alcohol. She reports that she does not use drugs.    Medications:   Medications Prior to Admission   Medication Sig Dispense Refill Last Dose   • acetaminophen (TYLENOL) 500 MG tablet 1-2 orally at bedtime   2023   • ASPIRIN 81 PO Daily.   2023 at 0800   • Cholecalciferol (Vitamin D3) 50 MCG ( UT) capsule Daily.   2023   • Coenzyme Q10 (COQ10 PO) Daily.   Past Week   • hydroxychloroquine (PLAQUENIL) 200 MG tablet Take 1 tablet by mouth.   2023   • ibuprofen (ADVIL,MOTRIN) 200 MG tablet 2 Orally in am and pm   2023   • loratadine (CLARITIN) 10 MG tablet    Past Week   • magnesium chloride ER 64 MG DR tablet Take  by mouth Daily.   Past Week   • Methylsulfonylmethane 1000 MG capsule Take  by mouth.   Past Week   • metoprolol tartrate (LOPRESSOR) 25 MG tablet    2023   • milk thistle 175 MG tablet Take 1 tablet by mouth Daily.   2023   • montelukast (SINGULAIR) 10 MG tablet Take 1 tablet by mouth Every Night.   Past Week   • multivitamin with minerals tablet tablet Take 1 tablet by mouth Daily.   2023   • NP Thyroid 90 MG tablet Take 1 tablet by mouth Daily.    "4/20/2023   • POTASSIUM GLUCONATE PO Daily.   4/20/2023   • Probiotic Product (PROBIOTIC DAILY PO) ONE   4/20/2023   • rOPINIRole (REQUIP) 0.25 MG tablet    Past Week   • Sennosides-Docusate Sodium (PERICOLACE) 8.6-50 MG per capsule Every 12 (Twelve) Hours.   4/20/2023   • Symbicort 160-4.5 MCG/ACT inhaler    4/21/2023   • traZODone (DESYREL) 50 MG tablet    Past Week   • vitamin B-12 (CYANOCOBALAMIN) 1000 MCG tablet Take 1 tablet by mouth Daily.   4/20/2023       Allergies:  Tape        Objective     Blood pressure 128/60, pulse 76, temperature 98.4 °F (36.9 °C), temperature source Temporal, resp. rate 18, height 154.9 cm (61\"), weight 115 kg (253 lb 8.5 oz), SpO2 92 %.    Physical Exam   Constitutional: Pt is oriented to person, place, and time and well-developed, well-nourished, and in no distress.   Mouth/Throat: Oropharynx is clear and moist.   Neck: Normal range of motion.   Cardiovascular: Normal rate, regular rhythm and normal heart sounds.    Pulmonary/Chest: Effort normal and breath sounds normal.   Abdominal: Soft. Nontender  Skin: Skin is warm and dry.   Psychiatric: Mood, memory, affect and judgment normal.     Assessment & Plan     Diagnosis:  Rectal bleeding    Anticipated Surgical Procedure:  Colonoscopy    The risks, benefits, and alternatives of this procedure have been discussed with the patient or the responsible party- the patient understands and agrees to proceed.            "

## 2023-04-21 NOTE — ANESTHESIA POSTPROCEDURE EVALUATION
Patient: Cris Cid    Procedure Summary     Date: 04/21/23 Room / Location: [facility] ENDOSCOPY 4 / [facility] ENDOSCOPY    Anesthesia Start: 1026 Anesthesia Stop: 1039    Procedure: SIGMOIDOSCOPY FLEXIBLE Diagnosis:       Rectal bleeding      (Rectal bleeding [K62.5])    Surgeons: Jake Lovett MD Provider: Nicolas Quiros MD    Anesthesia Type: general ASA Status: 3          Anesthesia Type: general    Vitals  Vitals Value Taken Time   /88 04/21/23 1054   Temp 36.7 °C (98.1 °F) 04/21/23 1039   Pulse 63 04/21/23 1055   Resp 20 04/21/23 1049   SpO2 98 % 04/21/23 1055   Vitals shown include unvalidated device data.        Post Anesthesia Care and Evaluation    Patient location during evaluation: bedside  Patient participation: complete - patient participated  Level of consciousness: awake and alert  Pain management: adequate    Airway patency: patent  Anesthetic complications: No anesthetic complications  PONV Status: none  Cardiovascular status: acceptable  Respiratory status: acceptable  Hydration status: acceptable    Comments: An Anesthesiologist personally participated in the most demanding procedures (including induction and emergence if applicable) in the anesthesia plan, monitored the course of anesthesia administration at frequent intervals and remained physically present and available for immediate diagnosis and treatment of emergencies.

## 2023-04-21 NOTE — ANESTHESIA PREPROCEDURE EVALUATION
Anesthesia Evaluation     Patient summary reviewed and Nursing notes reviewed   no history of anesthetic complications:  NPO Solid Status: > 8 hours  NPO Liquid Status: > 2 hours           Airway   Mallampati: II  TM distance: >3 FB  Neck ROM: full  No difficulty expected and Large neck circumference  Dental    (+) poor dentition    Comment: Denies loose, discolored tooth      Pulmonary - normal exam    breath sounds clear to auscultation  (+) pneumonia stable , asthma,sleep apnea,   Cardiovascular - normal exam  Exercise tolerance: good (4-7 METS)    Rhythm: regular  Rate: normal    (+) hypertension,       Neuro/Psych- negative ROS  GI/Hepatic/Renal/Endo    (+)  GI bleeding ,     Musculoskeletal     Abdominal    Substance History - negative use     OB/GYN negative ob/gyn ROS         Other   arthritis,      ROS/Med Hx Other: PAT Nursing Notes unavailable.                 Anesthesia Plan    ASA 3     general     (Total IV Anesthesia    Patient understands anesthesia not responsible for dental damage.  )  intravenous induction     Anesthetic plan, risks, benefits, and alternatives have been provided, discussed and informed consent has been obtained with: patient.  Pre-procedure education provided  Plan discussed with CRNA.        CODE STATUS:

## 2023-04-25 ENCOUNTER — TELEPHONE (OUTPATIENT)
Dept: GASTROENTEROLOGY | Facility: CLINIC | Age: 68
End: 2023-04-25
Payer: MEDICARE

## 2023-04-25 ENCOUNTER — PREP FOR SURGERY (OUTPATIENT)
Dept: OTHER | Facility: HOSPITAL | Age: 68
End: 2023-04-25
Payer: MEDICARE

## 2023-04-25 DIAGNOSIS — K62.5 RECTAL BLEEDING: Primary | ICD-10-CM

## 2023-04-25 RX ORDER — POLYETHYLENE GLYCOL 3350, SODIUM SULFATE ANHYDROUS, SODIUM BICARBONATE, SODIUM CHLORIDE, POTASSIUM CHLORIDE 227.1; 21.5; 6.36; 5.53; .754 G/L; G/L; G/L; G/L; G/L
4000 POWDER, FOR SOLUTION ORAL ONCE
Qty: 1 EACH | Refills: 0 | Status: SHIPPED | OUTPATIENT
Start: 2023-04-25 | End: 2023-04-25

## 2023-04-25 NOTE — TELEPHONE ENCOUNTER
Colonoscopy could not be completed due to poor prep. Patient will need repeat colonoscopy in 1 month. New case request placed. 4L prep sent to pharmacy. Patient will need extended bowel prep. Discuss low residue diet.

## 2023-04-25 NOTE — TELEPHONE ENCOUNTER
Spoke to pt and informed of Rosanne TANG result note and recommendations. Pt verified understanding.     Pt does report history of constipation. Pt reports since Colonoscopy she has started taking OTC Miralax every other day.   Pt reports that was informed at the hospital that she could do a lower volume prep like suprep. Educated pt on issues with lower volume prep and hx of constipation.  Pt reports that she does have a lapband and feels that the 4L was too much volume for her to handle as she felt very nauseated.  Pt inquiring if there is any other recommendations for bowel prep considering her hx of constipation and lap-band.   Please advise.

## 2023-05-04 DIAGNOSIS — K62.5 RECTAL BLEEDING: Primary | ICD-10-CM

## 2023-05-04 RX ORDER — POLYETHYLENE GLYCOL 3350, SODIUM SULFATE, SODIUM CHLORIDE, POTASSIUM CHLORIDE, ASCORBIC ACID, SODIUM ASCORBATE 140-9-5.2G
1 KIT ORAL TAKE AS DIRECTED
Qty: 1 EACH | Refills: 0 | Status: SHIPPED | OUTPATIENT
Start: 2023-05-04

## 2023-05-18 ENCOUNTER — TELEPHONE (OUTPATIENT)
Dept: GASTROENTEROLOGY | Facility: CLINIC | Age: 68
End: 2023-05-18
Payer: MEDICARE

## 2023-05-18 NOTE — TELEPHONE ENCOUNTER
PT CALLED AND STATES PHARMACY NEEDS PA ON PLENVU   -ADVISED PT WOULD SUBMIT PA AND CALL HER W/ RESPONSE    [id number] RX: PLENVU  PA REQUIRED - SENT TO PLAN VIA COVER MY MEDS -AWAITING RESPONSE

## 2023-05-22 RX ORDER — POLYETHYLENE GLYCOL 3350, SODIUM SULFATE, SODIUM CHLORIDE, POTASSIUM CHLORIDE, ASCORBIC ACID, SODIUM ASCORBATE 140-9-5.2G
1 KIT ORAL TAKE AS DIRECTED
Qty: 1 EACH | Refills: 0 | COMMUNITY
Start: 2023-05-22 | End: 2023-05-23

## 2023-05-22 NOTE — PRE-PROCEDURE INSTRUCTIONS
Instructed on arrival time.  Will need  over the age of 18 to drive them home.  Do not take any medications the day of the procedure, but bring them with you.  Discussed diet and bowel prep.  Come to entrance C.  Instructed to call if any questions or concerns.

## 2023-05-23 NOTE — PAT
Left message on voicemail with arrival time of  0600    Bring picture ID and insurance card, have  over 18 to give ride home.     Bring medication list but do not take any meds except inhalers that morning. Bring medications with you to the hospital, including inhalers.     Complete prep prior to arrival and call [phone] with any questions.     Come to [facility].

## 2023-05-25 ENCOUNTER — HOSPITAL ENCOUNTER (OUTPATIENT)
Facility: HOSPITAL | Age: 68
Setting detail: HOSPITAL OUTPATIENT SURGERY
Discharge: HOME | End: 2023-05-25
Attending: INTERNAL MEDICINE | Admitting: INTERNAL MEDICINE
Payer: MEDICARE

## 2023-05-25 ENCOUNTER — ANESTHESIA (OUTPATIENT)
Dept: GASTROENTEROLOGY | Facility: HOSPITAL | Age: 68
End: 2023-05-25
Payer: MEDICARE

## 2023-05-25 ENCOUNTER — ANESTHESIA EVENT (OUTPATIENT)
Dept: GASTROENTEROLOGY | Facility: HOSPITAL | Age: 68
End: 2023-05-25
Payer: MEDICARE

## 2023-05-25 VITALS
DIASTOLIC BLOOD PRESSURE: 78 MMHG | WEIGHT: 250.44 LBS | TEMPERATURE: 98.7 F | BODY MASS INDEX: 47.32 KG/M2 | HEART RATE: 67 BPM | OXYGEN SATURATION: 97 % | SYSTOLIC BLOOD PRESSURE: 132 MMHG | RESPIRATION RATE: 16 BRPM

## 2023-05-25 DIAGNOSIS — K62.5 RECTAL BLEEDING: ICD-10-CM

## 2023-05-25 PROCEDURE — 45380 COLONOSCOPY AND BIOPSY: CPT | Performed by: INTERNAL MEDICINE

## 2023-05-25 PROCEDURE — 45385 COLONOSCOPY W/LESION REMOVAL: CPT | Performed by: INTERNAL MEDICINE

## 2023-05-25 PROCEDURE — 88305 TISSUE EXAM BY PATHOLOGIST: CPT | Performed by: INTERNAL MEDICINE

## 2023-05-25 PROCEDURE — 25010000002 PROPOFOL 10 MG/ML EMULSION: Performed by: NURSE ANESTHETIST, CERTIFIED REGISTERED

## 2023-05-25 RX ORDER — SODIUM CHLORIDE, SODIUM LACTATE, POTASSIUM CHLORIDE, CALCIUM CHLORIDE 600; 310; 30; 20 MG/100ML; MG/100ML; MG/100ML; MG/100ML
30 INJECTION, SOLUTION INTRAVENOUS CONTINUOUS
Status: DISCONTINUED | OUTPATIENT
Start: 2023-05-25 | End: 2023-05-25 | Stop reason: HOSPADM

## 2023-05-25 RX ORDER — LIDOCAINE HYDROCHLORIDE 20 MG/ML
INJECTION, SOLUTION EPIDURAL; INFILTRATION; INTRACAUDAL; PERINEURAL AS NEEDED
Status: DISCONTINUED | OUTPATIENT
Start: 2023-05-25 | End: 2023-05-25 | Stop reason: SURG

## 2023-05-25 RX ORDER — PROPOFOL 10 MG/ML
VIAL (ML) INTRAVENOUS AS NEEDED
Status: DISCONTINUED | OUTPATIENT
Start: 2023-05-25 | End: 2023-05-25 | Stop reason: SURG

## 2023-05-25 RX ORDER — SODIUM CHLORIDE, SODIUM LACTATE, POTASSIUM CHLORIDE, CALCIUM CHLORIDE 600; 310; 30; 20 MG/100ML; MG/100ML; MG/100ML; MG/100ML
1000 INJECTION, SOLUTION INTRAVENOUS CONTINUOUS
Status: DISCONTINUED | OUTPATIENT
Start: 2023-05-25 | End: 2023-05-25 | Stop reason: HOSPADM

## 2023-05-25 RX ADMIN — SODIUM CHLORIDE, POTASSIUM CHLORIDE, SODIUM LACTATE AND CALCIUM CHLORIDE: 600; 310; 30; 20 INJECTION, SOLUTION INTRAVENOUS at 07:17

## 2023-05-25 RX ADMIN — LIDOCAINE HYDROCHLORIDE 50 MG: 20 INJECTION, SOLUTION EPIDURAL; INFILTRATION; INTRACAUDAL; PERINEURAL at 07:21

## 2023-05-25 RX ADMIN — PROPOFOL 100 MG: 10 INJECTION, EMULSION INTRAVENOUS at 07:21

## 2023-05-25 RX ADMIN — PROPOFOL 200 MCG/KG/MIN: 10 INJECTION, EMULSION INTRAVENOUS at 07:21

## 2023-05-25 NOTE — ANESTHESIA PREPROCEDURE EVALUATION
Anesthesia Evaluation     Patient summary reviewed and Nursing notes reviewed   no history of anesthetic complications:  NPO Solid Status: > 8 hours  NPO Liquid Status: > 2 hours           Airway   Mallampati: III  TM distance: >3 FB  Neck ROM: full  No difficulty expected  Dental      Pulmonary - normal exam    breath sounds clear to auscultation  (+) asthma,sleep apnea,   Cardiovascular - normal exam  Exercise tolerance: good (4-7 METS)    Rhythm: regular  Rate: normal    (+) hypertension,       Neuro/Psych- negative ROS  GI/Hepatic/Renal/Endo    (+)  GI bleeding ,     Musculoskeletal     Abdominal    Substance History - negative use     OB/GYN negative ob/gyn ROS         Other   arthritis,      ROS/Med Hx Other: PAT Nursing Notes unavailable.                   Anesthesia Plan    ASA 3     general     (Total IV Anesthesia    Patient understands anesthesia not responsible for dental damage.    Ayala draper sister in law  )  intravenous induction     Anesthetic plan, risks, benefits, and alternatives have been provided, discussed and informed consent has been obtained with: patient.    Plan discussed with CRNA.        CODE STATUS:

## 2023-05-25 NOTE — H&P
Pre Procedure History & Physical    Chief Complaint:   Rectal bleeding    Subjective     HPI:   Rectal bleeding    Past Medical History:   Past Medical History:   Diagnosis Date   • Asthma    • Hypertension    • Osteoarthritis    • Pneumonia    • Sleep apnea        Past Surgical History:  Past Surgical History:   Procedure Laterality Date   •  SECTION     • CHOLECYSTECTOMY     • HYSTERECTOMY     • KNEE SURGERY Right    • LAPAROSCOPIC GASTRIC BANDING     • SHOULDER SURGERY Right    • SIGMOIDOSCOPY N/A 2023    Procedure: SIGMOIDOSCOPY FLEXIBLE;  Surgeon: Jake Lovett MD;  Location: [facility] ENDOSCOPY;  Service: Gastroenterology;  Laterality: N/A;  poor prep   • TONSILLECTOMY         Family History:  Family History   Problem Relation Age of Onset   • Diabetes Father    • Diabetes Sister    • Asthma Sister    • Diabetes Brother    • Colon cancer Neg Hx        Social History:   reports that she has never smoked. She has never used smokeless tobacco. She reports that she does not currently use alcohol. She reports that she does not use drugs.    Medications:   Medications Prior to Admission   Medication Sig Dispense Refill Last Dose   • acetaminophen (TYLENOL) 500 MG tablet 1-2 orally at bedtime   Past Week   • ASPIRIN 81 PO Daily.   2023   • Cholecalciferol (Vitamin D3) 50 MCG (2000 UT) capsule Daily.   2023   • Coenzyme Q10 (COQ10 PO) Daily.   2023   • hydroxychloroquine (PLAQUENIL) 200 MG tablet Take 1 tablet by mouth.   2023   • ibuprofen (ADVIL,MOTRIN) 200 MG tablet 2 Orally in am and pm   2023   • loratadine (CLARITIN) 10 MG tablet    2023   • magnesium chloride ER 64 MG DR tablet Take  by mouth Daily.   2023   • Methylsulfonylmethane 1000 MG capsule Take  by mouth.   2023   • metoprolol tartrate (LOPRESSOR) 25 MG tablet    2023   • milk thistle 175 MG tablet Take 1 tablet by mouth Daily.   2023   • montelukast (SINGULAIR) 10 MG tablet Take 1  tablet by mouth Every Night.   5/24/2023   • multivitamin with minerals tablet tablet Take 1 tablet by mouth Daily.   5/24/2023   • NP Thyroid 90 MG tablet Take 1 tablet by mouth Daily.   5/24/2023   • POTASSIUM GLUCONATE PO Daily.   5/24/2023   • Probiotic Product (PROBIOTIC DAILY PO) ONE   5/24/2023   • rOPINIRole (REQUIP) 0.25 MG tablet    5/24/2023   • Sennosides-Docusate Sodium (PERICOLACE) 8.6-50 MG per capsule Every 12 (Twelve) Hours.   5/24/2023   • Symbicort 160-4.5 MCG/ACT inhaler    5/24/2023   • vitamin B-12 (CYANOCOBALAMIN) 1000 MCG tablet Take 1 tablet by mouth Daily.   5/24/2023   • traZODone (DESYREL) 50 MG tablet           Allergies:  Latex and Tape        Objective     Blood pressure 112/57, pulse 72, temperature 97.7 °F (36.5 °C), temperature source Temporal, resp. rate 16, weight 114 kg (250 lb 7.1 oz), SpO2 98 %.    Physical Exam   Constitutional: Pt is oriented to person, place, and time and well-developed, well-nourished, and in no distress.   Mouth/Throat: Oropharynx is clear and moist.   Neck: Normal range of motion.   Cardiovascular: Normal rate, regular rhythm and normal heart sounds.    Pulmonary/Chest: Effort normal and breath sounds normal.   Abdominal: Soft. Nontender  Skin: Skin is warm and dry.   Psychiatric: Mood, memory, affect and judgment normal.     Assessment & Plan     Diagnosis:  Rectal bleeding    Anticipated Surgical Procedure:  Colonoscopy    The risks, benefits, and alternatives of this procedure have been discussed with the patient or the responsible party- the patient understands and agrees to proceed.

## 2023-05-26 LAB
CYTO UR: NORMAL
LAB AP CASE REPORT: NORMAL
LAB AP CLINICAL INFORMATION: NORMAL
PATH REPORT.FINAL DX SPEC: NORMAL
PATH REPORT.GROSS SPEC: NORMAL

## 2023-05-30 NOTE — ANESTHESIA POSTPROCEDURE EVALUATION
Patient: Cris Cid    Procedure Summary     Date: 05/25/23 Room / Location: [facility] ENDOSCOPY 3 / [facility] ENDOSCOPY    Anesthesia Start: 0717 Anesthesia Stop: 0755    Procedure: COLONOSCOPY WITH POLYPECTOMY Diagnosis:       Rectal bleeding      (Rectal bleeding [K62.5])    Surgeons: Jkae Lovett MD Provider: Blake Bethea MD    Anesthesia Type: general ASA Status: 3          Anesthesia Type: general    Vitals  Vitals Value Taken Time   /80 05/25/23 0805   Temp 36.8 °C (98.3 °F) 05/25/23 0755   Pulse 79 05/25/23 0806   Resp 15 05/25/23 0805   SpO2 95 % 05/25/23 0806   Vitals shown include unvalidated device data.        Post Anesthesia Care and Evaluation    Patient location during evaluation: bedside  Patient participation: complete - patient participated  Level of consciousness: awake  Pain management: adequate    Airway patency: patent  PONV Status: none  Cardiovascular status: acceptable and stable  Respiratory status: acceptable  Hydration status: acceptable    Comments: An Anesthesiologist personally participated in the most demanding procedures (including induction and emergence if applicable) in the anesthesia plan, monitored the course of anesthesia administration at frequent intervals and remained physically present and available for immediate diagnosis and treatment of emergencies.             <-- Click to add NO pertinent Family History

## 2023-08-17 ENCOUNTER — OFFICE VISIT (OUTPATIENT)
Dept: PODIATRY | Facility: CLINIC | Age: 68
End: 2023-08-17
Payer: MEDICARE

## 2023-08-17 VITALS
TEMPERATURE: 97.8 F | HEIGHT: 61 IN | OXYGEN SATURATION: 96 % | WEIGHT: 253 LBS | HEART RATE: 71 BPM | DIASTOLIC BLOOD PRESSURE: 79 MMHG | BODY MASS INDEX: 47.77 KG/M2 | SYSTOLIC BLOOD PRESSURE: 127 MMHG

## 2023-08-17 DIAGNOSIS — M79.671 FOOT PAIN, RIGHT: ICD-10-CM

## 2023-08-17 DIAGNOSIS — L89.891 PRESSURE INJURY OF TOE, STAGE 1, UNSPECIFIED LATERALITY: ICD-10-CM

## 2023-08-17 DIAGNOSIS — M79.672 FOOT PAIN, LEFT: Primary | ICD-10-CM

## 2023-08-17 RX ORDER — AMMONIUM LACTATE 12 G/100G
LOTION TOPICAL 2 TIMES DAILY
Qty: 225 G | Refills: 11 | Status: SHIPPED | OUTPATIENT
Start: 2023-08-17

## 2023-08-17 RX ORDER — MELOXICAM 15 MG/1
15 TABLET ORAL DAILY
COMMUNITY

## 2023-08-17 NOTE — PROGRESS NOTES
[facility] - PODIATRY    Today's Date: 23    Patient Name: Cris Cid  MRN: [id number]  CSN: [id number]  PCP: Doreen Brewster APRN  Referring Provider: Doreen Brewster APRN    SUBJECTIVE     Chief Complaint   Patient presents with    Left Foot - Callouses, Pain    Right Foot - Callouses, Pain     States she has hammertoes and also crooked second toe states she has dropped things on it multiple times over the years nothing recent     HPI: Cris Cid, a 68 y.o.female, comes presents to clinic with chief complaint of:    New, Established, New Problem: New problem    Location:  hyperkeratotic lesion(s) on medial hallux bilaterally    Duration: Greater than 1 month    Onset:  gradual    Nature:  sore    Aggravating factors:  Patient reports lesion(s) is painful with shoegear and ambulation.      Previous Treatment:   None    Past Medical History:   Diagnosis Date    Asthma     Hypertension     Osteoarthritis     Pneumonia     Sleep apnea      Past Surgical History:   Procedure Laterality Date     SECTION      CHOLECYSTECTOMY      COLONOSCOPY N/A 2023    Procedure: COLONOSCOPY WITH POLYPECTOMY;  Surgeon: Jake Lovett MD;  Location: [facility] ENDOSCOPY;  Service: Gastroenterology;  Laterality: N/A;  COLON POLYPS, DIVERTICULOSIS, SIGMOID COLON LIPOMA, HEMORRHOIDS    HYSTERECTOMY      KNEE SURGERY Right     LAPAROSCOPIC GASTRIC BANDING      SHOULDER SURGERY Right     SIGMOIDOSCOPY N/A 2023    Procedure: SIGMOIDOSCOPY FLEXIBLE;  Surgeon: Jake Lovett MD;  Location: [facility] ENDOSCOPY;  Service: Gastroenterology;  Laterality: N/A;  poor prep    TONSILLECTOMY       Family History   Problem Relation Age of Onset    Diabetes Father     Diabetes Sister     Asthma Sister     Diabetes Brother     Colon cancer Neg Hx      Social History     Socioeconomic History    Marital status:    Tobacco Use    Smoking status: Never    Smokeless tobacco: Never   Vaping Use     Vaping Use: Never used   Substance and Sexual Activity    Alcohol use: Not Currently    Drug use: Never    Sexual activity: Defer     Birth control/protection: Hysterectomy     Allergies   Allergen Reactions    Latex Itching    Tape Other (See Comments)     Paper tape causes blisters     Current Outpatient Medications   Medication Sig Dispense Refill    acetaminophen (TYLENOL) 500 MG tablet 1-2 orally at bedtime      ammonium lactate (AmLactin) 12 % lotion Apply  topically to the appropriate area as directed 2 (Two) Times a Day. 225 g 11    ASPIRIN 81 PO Daily.      Cholecalciferol (Vitamin D3) 50 MCG (2000 UT) capsule Daily.      Coenzyme Q10 (COQ10 PO) Daily.      hydroxychloroquine (PLAQUENIL) 200 MG tablet Take 1 tablet by mouth.      loratadine (CLARITIN) 10 MG tablet       magnesium chloride ER 64 MG DR tablet Take  by mouth Daily.      meloxicam (MOBIC) 15 MG tablet Take 1 tablet by mouth Daily.      Methylsulfonylmethane 1000 MG capsule Take  by mouth.      metoprolol tartrate (LOPRESSOR) 25 MG tablet       milk thistle 175 MG tablet Take 1 tablet by mouth Daily.      montelukast (SINGULAIR) 10 MG tablet Take 1 tablet by mouth Every Night.      multivitamin with minerals tablet tablet Take 1 tablet by mouth Daily.      NP Thyroid 90 MG tablet Take 1 tablet by mouth Daily.      POTASSIUM GLUCONATE PO Daily.      Probiotic Product (PROBIOTIC DAILY PO) ONE      rOPINIRole (REQUIP) 0.25 MG tablet       Sennosides-Docusate Sodium (PERICOLACE) 8.6-50 MG per capsule Every 12 (Twelve) Hours.      Symbicort 160-4.5 MCG/ACT inhaler       traZODone (DESYREL) 50 MG tablet       vitamin B-12 (CYANOCOBALAMIN) 1000 MCG tablet Take 1 tablet by mouth Daily.       No current facility-administered medications for this visit.     Review of Systems   Constitutional: Negative.    Skin:         Hyperkeratotic lesions on medial hallux bilaterally.   All other systems reviewed and are negative.    OBJECTIVE     Vitals:    08/17/23  0934   BP: 127/79   Pulse: 71   Temp: 97.8 øF (36.6 øC)   SpO2: 96%       Body mass index is 47.8 kg/mý.    Lab Results   Component Value Date    GLUCOSE 98 01/03/2023    CALCIUM 9.1 01/03/2023     01/03/2023    K 4.2 01/03/2023    CO2 26.7 01/03/2023     01/03/2023    BUN 26 (H) 01/03/2023    CREATININE 0.69 01/03/2023    BCR 37.7 (H) 01/03/2023    ANIONGAP 8.3 01/03/2023       Patient seen in no apparent distress.      PHYSICAL EXAM:     Foot/Ankle Exam    GENERAL  Appearance:  obese and elderly  Orientation:  AAOx3  Affect:  appropriate  Gait:  unimpaired  Assistance:  independent  Right shoe gear: sandal  Left shoe gear: sandal    VASCULAR     Right Foot Vascularity   Dorsalis pedis:  2+  Posterior tibial:  2+  Skin temperature:  warm  Edema grading:  None  CFT:  < 3 seconds  Pedal hair growth:  Absent  Varicosities:  mild varicosities     Left Foot Vascularity   Dorsalis pedis:  2+  Posterior tibial:  2+  Skin temperature:  warm  Edema grading:  None  CFT:  < 3 seconds  Pedal hair growth:  Absent  Varicosities:  mild varicosities     NEUROLOGIC     Right Foot Neurologic   Normal sensation    Light touch sensation: normal  Vibratory sensation: normal  Hot/Cold sensation: normal  Protective Sensation using [place]-Flex Monofilament:   Sites intact: 10  Sites tested: 10     Left Foot Neurologic   Normal sensation    Light touch sensation: normal  Vibratory sensation: normal  Hot/Cold sensation:  normal  Protective Sensation using [place]-Flex Monofilament:   Sites intact: 10  Sites tested: 10    MUSCULOSKELETAL     Right Foot Musculoskeletal   Tenderness:  toe 1 tenderness       Left Foot Musculoskeletal   Tenderness:  toe 1 tenderness    MUSCLE STRENGTH     Right Foot Muscle Strength   Foot dorsiflexion:  4  Foot plantar flexion:  4  Foot inversion:  4  Foot eversion:  4     Left Foot Muscle Strength   Foot dorsiflexion:  4  Foot plantar flexion:  4  Foot inversion:  4  Foot eversion:   4    RANGE OF MOTION     Right Foot Range of Motion   Foot and ankle ROM within normal limits       Left Foot Range of Motion   Foot and ankle ROM within normal limits      DERMATOLOGIC      Right Foot Dermatologic   Skin  Positive for ulcer.      Left Foot Dermatologic   Skin  Positive for ulcer.      Right foot additional comments: Stage 1 ulceration on the medial hallux area of the foot.  Hyperkeratotic tissue with subepidermal hemosiderin deposition is present.  No surrounding, edema, erythema, lymphangitis, fluctuance, nor signs of infection.  No drainage present.  26 mm x 16 mm x 4 mm.  This area was debrided via excisional partial thickness debridement with a 10 scalpel blade.  Post debridement measurements were 24 mm x 13 mm x 1 mm in depth.     Left foot additional comments: Stage 1 ulceration on the hallux area of the foot.  Hyperkeratotic tissue with subepidermal hemosiderin deposition is present.  No surrounding, edema, erythema, lymphangitis, fluctuance, nor signs of infection.  No drainage present.  22 mm x 13 mm x 4 mm.  This area was debrided via excisional partial thickness debridement with a 10 scalpel blade.  Post debridement measurements were 21 mm x 11 mm x 1 mm in depth.    ASSESSMENT/PLAN     Diagnoses and all orders for this visit:    1. Foot pain, left (Primary)    2. Foot pain, right    3. Pressure injury of toe, stage 1, unspecified laterality  -     ammonium lactate (AmLactin) 12 % lotion; Apply  topically to the appropriate area as directed 2 (Two) Times a Day.  Dispense: 225 g; Refill: 11        Comprehensive lower extremity examination and evaluation was performed.    Discussed findings and treatment plan including risks, benefits, and treatment options with patient in detail. Patient agreed with treatment plan.    Patient is to monitor for recurrence and any new symptoms and to contact Dr. Stevenson's office for a follow-up appointment.      The patient states understanding and  agreement with this plan.    An After Visit Summary was printed and given to the patient at discharge, including (if requested) any available informative/educational handouts regarding diagnosis, treatment, or medications. All questions were answered to patient/family satisfaction. Should symptoms fail to improve or worsen they agree to call or return to clinic or to go to the Emergency Department. Discussed the importance of following up with any needed screening tests/labs/specialist appointments and any requested follow-up recommended by me today. Importance of maintaining follow-up discussed and patient accepts that missed appointments can delay diagnosis and potentially lead to worsening of conditions.    Return for Patient request PRN follow-up., or sooner if acute issues arise.    This document has been electronically signed by Andrea Stevenson DPM on August 17, 2023 10:25 EDT

## 2024-09-11 ENCOUNTER — DOCUMENTATION (OUTPATIENT)
Dept: PHYSICAL THERAPY | Facility: CLINIC | Age: 69
End: 2024-09-11
Payer: MEDICARE

## 2024-09-11 NOTE — PROGRESS NOTES
Outpatient Physical Therapy  [address], [place] [address]      Discharge Summary  Discharge Summary from Physical Therapy Report      Dates  PT visit: 2/14/23-3/1/23  Number of Visits: 4       Goals  Plan Goals: LUMBAR PROBLEMS:    1. The patient has complaints of pain.    LTG 1: 12 weeks: The patient will report lower back pain no greater than 2/10 in order to improve tolerance with activities of daily living and improve sleep quality.    STATUS: New    STG 1a: 6 weeks: The patient will report lower back pain no greater than 4/10.     STATUS: New      2. The patient has limited lumbar spine ROM.    LTG 2: 12 weeks: The patient will improve lumbar spine ROM to 100% in order to improve tolerance with activities of daily living.    STATUS: New      STG 2a: 6 weeks: The patient will improve lumbar spine ROM to 75% in order to improve tolerance with activities of daily living.    STATUS: New    3. The patient demonstrates weakness of the bilateral hip.    LTG 3: 12 weeks: The patient will demonstrate 5/5 strength for bilateral hip flexion, abduction, and extension in order to improve hip stability.    STATUS: New      STG 3a: 6 weeks: The patient will demonstrate 4+/5 strength for bilateral hip flexion, abduction, and extension.    STATUS: New      Mobility: Walking/Moving Around Functional Limitation    LTG 4: 12 weeks: The patient will demonstrate 1-19% limitation by achieving a score of 10 on the Oswestry Disability Questionnaire.    STATUS: New    STG 4a: 6 weeks: The patient will demonstrate 1-19% limitation by achieving a score of 20-39% on the Oswestry Disability Quiestionnaire.    STATUS: New    Discharge Plan: Continue with current home exercise program as instructed    Date of Discharge 9/11/2024      Electronically signed:   Shaye Ritter PTA  Physical Therapist Assistant  [facility] License #: [id number]

## 2025-06-15 ENCOUNTER — HOSPITAL ENCOUNTER (EMERGENCY)
Facility: HOSPITAL | Age: 70
Discharge: OTHER INSTITUTION | End: 2025-06-15
Attending: EMERGENCY MEDICINE
Payer: MEDICARE

## 2025-06-15 ENCOUNTER — APPOINTMENT (OUTPATIENT)
Dept: GENERAL RADIOLOGY | Facility: HOSPITAL | Age: 70
End: 2025-06-15
Payer: MEDICARE

## 2025-06-15 VITALS
HEIGHT: 61 IN | RESPIRATION RATE: 16 BRPM | WEIGHT: 272.05 LBS | OXYGEN SATURATION: 97 % | DIASTOLIC BLOOD PRESSURE: 61 MMHG | TEMPERATURE: 98.3 F | BODY MASS INDEX: 51.36 KG/M2 | SYSTOLIC BLOOD PRESSURE: 117 MMHG | HEART RATE: 66 BPM

## 2025-06-15 DIAGNOSIS — S72.491A OTHER CLOSED FRACTURE OF DISTAL END OF RIGHT FEMUR, INITIAL ENCOUNTER: Primary | ICD-10-CM

## 2025-06-15 LAB
ALBUMIN SERPL-MCNC: 4.3 G/DL (ref 3.5–5.2)
ALBUMIN/GLOB SERPL: 1.5 G/DL
ALP SERPL-CCNC: 76 U/L (ref 39–117)
ALT SERPL W P-5'-P-CCNC: 36 U/L (ref 1–33)
ANION GAP SERPL CALCULATED.3IONS-SCNC: 10.6 MMOL/L (ref 5–15)
AST SERPL-CCNC: 38 U/L (ref 1–32)
BASOPHILS # BLD AUTO: 0.02 10*3/MM3 (ref 0–0.2)
BASOPHILS NFR BLD AUTO: 0.2 % (ref 0–1.5)
BILIRUB SERPL-MCNC: 0.5 MG/DL (ref 0–1.2)
BUN SERPL-MCNC: 26.3 MG/DL (ref 8–23)
BUN/CREAT SERPL: 36.5 (ref 7–25)
CALCIUM SPEC-SCNC: 9.2 MG/DL (ref 8.6–10.5)
CHLORIDE SERPL-SCNC: 105 MMOL/L (ref 98–107)
CO2 SERPL-SCNC: 24.4 MMOL/L (ref 22–29)
CREAT SERPL-MCNC: 0.72 MG/DL (ref 0.57–1)
DEPRECATED RDW RBC AUTO: 44.7 FL (ref 37–54)
EGFRCR SERPLBLD CKD-EPI 2021: 90.1 ML/MIN/1.73
EOSINOPHIL # BLD AUTO: 0.29 10*3/MM3 (ref 0–0.4)
EOSINOPHIL NFR BLD AUTO: 3.1 % (ref 0.3–6.2)
ERYTHROCYTE [DISTWIDTH] IN BLOOD BY AUTOMATED COUNT: 13.2 % (ref 12.3–15.4)
GLOBULIN UR ELPH-MCNC: 2.8 GM/DL
GLUCOSE SERPL-MCNC: 132 MG/DL (ref 65–99)
HCT VFR BLD AUTO: 44 % (ref 34–46.6)
HGB BLD-MCNC: 14.2 G/DL (ref 12–15.9)
HOLD SPECIMEN: NORMAL
IMM GRANULOCYTES # BLD AUTO: 0.05 10*3/MM3 (ref 0–0.05)
IMM GRANULOCYTES NFR BLD AUTO: 0.5 % (ref 0–0.5)
LYMPHOCYTES # BLD AUTO: 1.64 10*3/MM3 (ref 0.7–3.1)
LYMPHOCYTES NFR BLD AUTO: 17.6 % (ref 19.6–45.3)
MCH RBC QN AUTO: 30 PG (ref 26.6–33)
MCHC RBC AUTO-ENTMCNC: 32.3 G/DL (ref 31.5–35.7)
MCV RBC AUTO: 93 FL (ref 79–97)
MONOCYTES # BLD AUTO: 0.7 10*3/MM3 (ref 0.1–0.9)
MONOCYTES NFR BLD AUTO: 7.5 % (ref 5–12)
NEUTROPHILS NFR BLD AUTO: 6.6 10*3/MM3 (ref 1.7–7)
NEUTROPHILS NFR BLD AUTO: 71.1 % (ref 42.7–76)
NRBC BLD AUTO-RTO: 0 /100 WBC (ref 0–0.2)
PLATELET # BLD AUTO: 160 10*3/MM3 (ref 140–450)
PMV BLD AUTO: 10.7 FL (ref 6–12)
POTASSIUM SERPL-SCNC: 4.5 MMOL/L (ref 3.5–5.2)
PROT SERPL-MCNC: 7.1 G/DL (ref 6–8.5)
RBC # BLD AUTO: 4.73 10*6/MM3 (ref 3.77–5.28)
SODIUM SERPL-SCNC: 140 MMOL/L (ref 136–145)
WBC NRBC COR # BLD AUTO: 9.3 10*3/MM3 (ref 3.4–10.8)
WHOLE BLOOD HOLD COAG: NORMAL

## 2025-06-15 PROCEDURE — 25010000002 HYDROMORPHONE 1 MG/ML SOLUTION: Performed by: EMERGENCY MEDICINE

## 2025-06-15 PROCEDURE — 96374 THER/PROPH/DIAG INJ IV PUSH: CPT

## 2025-06-15 PROCEDURE — 25010000002 ONDANSETRON PER 1 MG: Performed by: EMERGENCY MEDICINE

## 2025-06-15 PROCEDURE — 25010000002 MORPHINE PER 10 MG: Performed by: EMERGENCY MEDICINE

## 2025-06-15 PROCEDURE — 73562 X-RAY EXAM OF KNEE 3: CPT

## 2025-06-15 PROCEDURE — 85025 COMPLETE CBC W/AUTO DIFF WBC: CPT | Performed by: EMERGENCY MEDICINE

## 2025-06-15 PROCEDURE — 80053 COMPREHEN METABOLIC PANEL: CPT | Performed by: EMERGENCY MEDICINE

## 2025-06-15 PROCEDURE — 96375 TX/PRO/DX INJ NEW DRUG ADDON: CPT

## 2025-06-15 PROCEDURE — 99285 EMERGENCY DEPT VISIT HI MDM: CPT

## 2025-06-15 RX ORDER — HYDROCODONE BITARTRATE AND ACETAMINOPHEN 7.5; 325 MG/1; MG/1
1 TABLET ORAL ONCE
Refills: 0 | Status: DISCONTINUED | OUTPATIENT
Start: 2025-06-15 | End: 2025-06-15

## 2025-06-15 RX ORDER — ONDANSETRON 2 MG/ML
4 INJECTION INTRAMUSCULAR; INTRAVENOUS ONCE
Status: COMPLETED | OUTPATIENT
Start: 2025-06-15 | End: 2025-06-15

## 2025-06-15 RX ADMIN — HYDROMORPHONE HYDROCHLORIDE 0.5 MG: 1 INJECTION, SOLUTION INTRAMUSCULAR; INTRAVENOUS; SUBCUTANEOUS at 15:46

## 2025-06-15 RX ADMIN — MORPHINE SULFATE 4 MG: 4 INJECTION, SOLUTION INTRAMUSCULAR; INTRAVENOUS at 13:46

## 2025-06-15 RX ADMIN — ONDANSETRON 4 MG: 2 INJECTION INTRAMUSCULAR; INTRAVENOUS at 13:46

## 2025-06-15 NOTE — ED PROVIDER NOTES
Time: 2:31 PM EDT  Date of encounter:  6/15/2025  Independent Historian/Clinical History and Information was obtained by:   Patient    History is limited by: N/A    Chief Complaint: Right leg pain      History of Present Illness:  Patient is a 70 y.o. year old female who presents to the emergency department for evaluation of right knee pain.  Patient reports she was assisting her  into their wheelchair van at [facility] when she fell landing on her right knee.  Patient reports severe right knee pain.  Denies any other pain or injuries.  Denies any her head or any loss of consciousness.  Denies use of blood thinners.      Patient Care Team  Primary Care Provider: Doreen Brewster APRN    Past Medical History:     Allergies   Allergen Reactions    Latex Itching    Tape Other (See Comments)     Paper tape causes blisters     Past Medical History:   Diagnosis Date    Asthma     Hypertension     Osteoarthritis     Pneumonia     Sleep apnea      Past Surgical History:   Procedure Laterality Date     SECTION      CHOLECYSTECTOMY      COLONOSCOPY N/A 2023    Procedure: COLONOSCOPY WITH POLYPECTOMY;  Surgeon: Jake Lovett MD;  Location: [facility] ENDOSCOPY;  Service: Gastroenterology;  Laterality: N/A;  COLON POLYPS, DIVERTICULOSIS, SIGMOID COLON LIPOMA, HEMORRHOIDS    HYSTERECTOMY      KNEE SURGERY Right     LAPAROSCOPIC GASTRIC BANDING      SHOULDER SURGERY Right     SIGMOIDOSCOPY N/A 2023    Procedure: SIGMOIDOSCOPY FLEXIBLE;  Surgeon: Jake Lovett MD;  Location: [facility] ENDOSCOPY;  Service: Gastroenterology;  Laterality: N/A;  poor prep    TONSILLECTOMY       Family History   Problem Relation Age of Onset    Diabetes Father     Diabetes Sister     Asthma Sister     Diabetes Brother     Colon cancer Neg Hx        Home Medications:  Prior to Admission medications    Medication Sig Start Date End Date Taking? Authorizing Provider   acetaminophen (TYLENOL) 500 MG tablet 1-2 orally at  bedtime    Kashmir Mike MD   ammonium lactate (AmLactin) 12 % lotion Apply  topically to the appropriate area as directed 2 (Two) Times a Day. 8/17/23   Andrea Stevenson DPM   ASPIRIN 81 PO Daily.    Nurse Ashley Ceja RN   Cholecalciferol (Vitamin D3) 50 MCG (2000 UT) capsule Daily.    Kashmir Mike MD   Coenzyme Q10 (COQ10 PO) Daily.    Nurse Ashley Ceja RN   hydroxychloroquine (PLAQUENIL) 200 MG tablet Take 1 tablet by mouth. 12/21/22   Kashmir Mike MD   loratadine (CLARITIN) 10 MG tablet  10/13/21   Kashmir Mike MD   magnesium chloride ER 64 MG DR tablet Take  by mouth Daily.    Kashmir Mike MD   meloxicam (MOBIC) 15 MG tablet Take 1 tablet by mouth Daily.    Kashmir Mike MD   Methylsulfonylmethane 1000 MG capsule Take  by mouth.    Kashmir Mike MD   metoprolol tartrate (LOPRESSOR) 25 MG tablet  10/13/21   Nurse Ashley Ceja RN   milk thistle 175 MG tablet Take 1 tablet by mouth Daily.    Kashmir Mike MD   montelukast (SINGULAIR) 10 MG tablet Take 1 tablet by mouth Every Night.    Kashmir Mike MD   multivitamin with minerals tablet tablet Take 1 tablet by mouth Daily.    Kashmir Mike MD   NP Thyroid 90 MG tablet Take 1 tablet by mouth Daily. 5/23/22   Kashmir Mike MD   POTASSIUM GLUCONATE PO Daily.    Nurse Ashley Ceja RN   Probiotic Product (PROBIOTIC DAILY PO) ONE    Nurse Ashley Ceja RN   rOPINIRole (REQUIP) 0.25 MG tablet  10/13/21   Emergency, Nurse Epic, RN   Sennosides-Docusate Sodium (PERICOLACE) 8.6-50 MG per capsule Every 12 (Twelve) Hours.    Kashmir Mike MD   Symbicort 160-4.5 MCG/ACT inhaler  10/13/21   Nurse Ashley Ceja RN   traZODone (DESYREL) 50 MG tablet  10/13/21   Nurse Ashley Ceja RN   vitamin B-12 (CYANOCOBALAMIN) 1000 MCG tablet Take 1 tablet by mouth Daily.    Kashmir Mike MD        Social History:   Social History     Tobacco Use     "Smoking status: Never    Smokeless tobacco: Never   Vaping Use    Vaping status: Never Used   Substance Use Topics    Alcohol use: Not Currently    Drug use: Never         Review of Systems:  Review of Systems   Constitutional:  Negative for chills, fatigue and fever.   HENT:  Negative for ear pain, rhinorrhea and sore throat.    Eyes:  Negative for visual disturbance.   Respiratory:  Negative for cough and shortness of breath.    Cardiovascular:  Negative for chest pain.   Gastrointestinal:  Negative for abdominal pain, diarrhea and vomiting.   Genitourinary:  Negative for difficulty urinating.   Musculoskeletal:  Positive for arthralgias. Negative for back pain and myalgias.   Skin:  Negative for rash.   Neurological:  Negative for light-headedness and headaches.   Hematological:  Negative for adenopathy.   Psychiatric/Behavioral: Negative.          Physical Exam:  /61 (BP Location: Right arm, Patient Position: Lying)   Pulse 66   Temp 98.3 °F (36.8 °C) (Oral)   Resp 16   Ht 154.9 cm (60.98\")   Wt 123 kg (272 lb 0.8 oz)   SpO2 97%   BMI 51.43 kg/m²     Physical Exam  Vitals and nursing note reviewed.   Constitutional:       General: She is not in acute distress.     Appearance: Normal appearance. She is not toxic-appearing.   HENT:      Head: Normocephalic and atraumatic.      Nose: Nose normal.      Mouth/Throat:      Mouth: Mucous membranes are moist.   Eyes:      Conjunctiva/sclera: Conjunctivae normal.   Cardiovascular:      Rate and Rhythm: Normal rate and regular rhythm.      Pulses: Normal pulses.      Heart sounds: Normal heart sounds.   Pulmonary:      Effort: Pulmonary effort is normal.      Breath sounds: Normal breath sounds.   Abdominal:      General: Bowel sounds are normal.      Palpations: Abdomen is soft.      Tenderness: There is no abdominal tenderness.   Musculoskeletal:         General: Swelling, tenderness and deformity (Right knee) present. Normal range of motion.      Cervical " back: Normal range of motion.   Skin:     General: Skin is warm and dry.      Findings: Bruising present.   Neurological:      General: No focal deficit present.      Mental Status: She is alert and oriented to person, place, and time.   Psychiatric:         Mood and Affect: Mood normal.         Behavior: Behavior normal.         Thought Content: Thought content normal.         Judgment: Judgment normal.                  Medical Decision Making:      Comorbidities that affect care:    Asthma, Hypertension    External Notes reviewed:    None      The following orders were placed and all results were independently analyzed by me:  Orders Placed This Encounter   Procedures    XR Knee 3 View Right    Comprehensive Metabolic Panel    CBC Auto Differential    Obtain & Apply The Following- Lower extremity; Knee immobilizer    IP Consult to Orthopedic Surgery    IP General Consult (Use specialty-specific consult if known)    CBC & Differential    Extra Tubes    Gold Top - SST    Light Blue Top       Medications Given in the Emergency Department:  Medications   morphine injection 4 mg (4 mg Intravenous Given 6/15/25 1346)   ondansetron (ZOFRAN) injection 4 mg (4 mg Intravenous Given 6/15/25 1346)   HYDROmorphone (DILAUDID) injection 0.5 mg (0.5 mg Intravenous Given 6/15/25 1546)        ED Course:    ED Course as of 06/15/25 1556   Sun Chin 15, 2025   1437 Patient excepted for transfer by Dr. Regalado in the ER at [facility]. [CE]      ED Course User Index  [CE] Pat Rayo APRN       Labs:    Lab Results (last 24 hours)       Procedure Component Value Units Date/Time    CBC & Differential [[id number]]  (Abnormal) Collected: 06/15/25 1341    Specimen: Blood Updated: 06/15/25 1348    Narrative:      The following orders were created for panel order CBC & Differential.  Procedure                               Abnormality         Status                     ---------                                -----------         ------                     CBC Auto Differential[[id number]]        Abnormal            Final result                 Please view results for these tests on the individual orders.    Comprehensive Metabolic Panel [[id number]]  (Abnormal) Collected: 06/15/25 1341    Specimen: Blood Updated: 06/15/25 1408     Glucose 132 mg/dL      BUN 26.3 mg/dL      Creatinine 0.72 mg/dL      Sodium 140 mmol/L      Potassium 4.5 mmol/L      Comment: Slight hemolysis detected by analyzer. Result may be falsely elevated.        Chloride 105 mmol/L      CO2 24.4 mmol/L      Calcium 9.2 mg/dL      Total Protein 7.1 g/dL      Albumin 4.3 g/dL      ALT (SGPT) 36 U/L      AST (SGOT) 38 U/L      Alkaline Phosphatase 76 U/L      Total Bilirubin 0.5 mg/dL      Globulin 2.8 gm/dL      A/G Ratio 1.5 g/dL      BUN/Creatinine Ratio 36.5     Anion Gap 10.6 mmol/L      eGFR 90.1 mL/min/1.73     Narrative:      GFR Categories in Chronic Kidney Disease (CKD)              GFR Category          GFR (mL/min/1.73)    Interpretation  G1                    90 or greater        Normal or high (1)  G2                    60-89                Mild decrease (1)  G3a                   45-59                Mild to moderate decrease  G3b                   30-44                Moderate to severe decrease  G4                    15-29                Severe decrease  G5                    14 or less           Kidney failure    (1)In the absence of evidence of kidney disease, neither GFR category G1 or G2 fulfill the criteria for CKD.    eGFR calculation 2021 CKD-EPI creatinine equation, which does not include race as a factor    CBC Auto Differential [[id number]]  (Abnormal) Collected: 06/15/25 1341    Specimen: Blood Updated: 06/15/25 1348     WBC 9.30 10*3/mm3      RBC 4.73 10*6/mm3      Hemoglobin 14.2 g/dL      Hematocrit 44.0 %      MCV 93.0 fL      MCH 30.0 pg      MCHC 32.3 g/dL      RDW 13.2 %      RDW-SD 44.7 fl      MPV 10.7 fL      Platelets  160 10*3/mm3      Neutrophil % 71.1 %      Lymphocyte % 17.6 %      Monocyte % 7.5 %      Eosinophil % 3.1 %      Basophil % 0.2 %      Immature Grans % 0.5 %      Neutrophils, Absolute 6.60 10*3/mm3      Lymphocytes, Absolute 1.64 10*3/mm3      Monocytes, Absolute 0.70 10*3/mm3      Eosinophils, Absolute 0.29 10*3/mm3      Basophils, Absolute 0.02 10*3/mm3      Immature Grans, Absolute 0.05 10*3/mm3      nRBC 0.0 /100 WBC              Imaging:    XR Knee 3 View Right  Result Date: 6/15/2025  XR KNEE 3 VW RIGHT Date of Exam: 6/15/2025 1:21 PM EDT Indication: right knee Comparison: None available. Findings: Study limited by osteopenia and positioning. There is a comminuted intra-articular fracture involving the distal right femur extending from the distal metaphysis with longitudinal fracture lines extending towards the lateral compartment. Additional comminuted fracture fragments in the metadiaphyseal region medially and laterally noted. There is foreshortening of the distal fracture fragments and a large amount of soft tissue swelling, joint effusion.     Impression: Comminuted intra-articular fracture with foreshortening of the fracture fragments noted. Evaluation is limited by positioning and osteopenia. Electronically Signed: Brady Morrell MD  6/15/2025 1:37 PM EDT  Workstation ID: [id number]        Differential Diagnosis and Discussion:    Extremity Pain: Differential diagnosis includes but is not limited to soft tissue sprain, tendonitis, tendon injury, dislocation, fracture, deep vein thrombosis, arterial insufficiency, osteoarthritis, bursitis, and ligamentous damage.    PROCEDURES:    X-ray were performed in the emergency department and all X-ray impressions were independently interpreted by me.    No orders to display       Procedures    MDM  Number of Diagnoses or Management Options  Other closed fracture of distal end of right femur, initial encounter  Diagnosis management comments: X-ray of the right  knee positive for distal fracture of the right femur.  I spoke with Ortho who advised patient would be better served in trauma center.  CBC unremarkable.  Glucose of 132 and CMP otherwise unremarkable.  Dr. Mcnamara at [facility] has accepted patient for transfer for ED to ED.                       Patient Care Considerations:    CT HEAD: I considered ordering a noncontrast CT of the head, however patient does not urinate or any loss of consciousness.      Consultants/Shared Management Plan:    SHARED VISIT: I have discussed the case with my supervising physician, Dr. Lynn who stateshe agrees with treatment plan. The substantive portion of the medical decision was made by the attesting physician who made or approve the management plan and will take responsibility for the patient.  Clinical findings were discussed and ultimate disposition was made in consult with supervising physician.  Consultant: I have discussed the case with Dr. Navarro who states patient should be transferred to [facility] trauma center for higher level of care due to the nature of her femur fracture.  Transfer Provider: I have discussed the case with Dr. Mcnamara at [facility] who agrees to accept the patient as a transfer.    Social Determinants of Health:    Patient is independent, reliable, and has access to care.       Disposition and Care Coordination:    Transferred: Through independent evaluation of the patient's history, physical, and imperical data, the patient meets criteria to be transferred to another hospital for evaluation/admission.        Final diagnoses:   Other closed fracture of distal end of right femur, initial encounter        ED Disposition       ED Disposition   Transfer to Another Facility     Condition   --    Comment   --               This medical record created using voice recognition software.             Pat Rayo, APRN  06/22/25 0701

## 2025-07-30 ENCOUNTER — LAB REQUISITION (OUTPATIENT)
Dept: LAB | Facility: HOSPITAL | Age: 70
End: 2025-07-30
Payer: MEDICARE

## 2025-07-30 DIAGNOSIS — N39.0 URINARY TRACT INFECTION, SITE NOT SPECIFIED: ICD-10-CM

## 2025-07-30 LAB
BACTERIA UR QL AUTO: ABNORMAL /HPF
BILIRUB UR QL STRIP: NEGATIVE
CLARITY UR: CLEAR
COLOR UR: ABNORMAL
GLUCOSE UR STRIP-MCNC: NEGATIVE MG/DL
HGB UR QL STRIP.AUTO: NEGATIVE
HYALINE CASTS UR QL AUTO: ABNORMAL /LPF
KETONES UR QL STRIP: NEGATIVE
LEUKOCYTE ESTERASE UR QL STRIP.AUTO: NEGATIVE
NITRITE UR QL STRIP: NEGATIVE
PH UR STRIP.AUTO: 5.5 [PH] (ref 5–8)
PROT UR QL STRIP: NEGATIVE
RBC # UR STRIP: ABNORMAL /HPF
REF LAB TEST METHOD: ABNORMAL
SP GR UR STRIP: 1.02 (ref 1–1.03)
SQUAMOUS #/AREA URNS HPF: ABNORMAL /HPF
UROBILINOGEN UR QL STRIP: ABNORMAL
WBC # UR STRIP: ABNORMAL /HPF

## 2025-07-30 PROCEDURE — 81001 URINALYSIS AUTO W/SCOPE: CPT | Performed by: NURSE PRACTITIONER

## (undated) DEVICE — SOL IRRG H2O PL/BG 1000ML STRL

## (undated) DEVICE — Device

## (undated) DEVICE — Device: Brand: DEFENDO AIR/WATER/SUCTION AND BIOPSY VALVE

## (undated) DEVICE — SNAR E/S POLYP SNAREMASTER OVL/10MM 2.8X2300MM YEL

## (undated) DEVICE — SINGLE-USE BIOPSY FORCEPS: Brand: RADIAL JAW 4

## (undated) DEVICE — SOLIDIFIER LIQLOC PLS 1500CC BT